# Patient Record
Sex: FEMALE | Race: WHITE | NOT HISPANIC OR LATINO | Employment: UNEMPLOYED | URBAN - METROPOLITAN AREA
[De-identification: names, ages, dates, MRNs, and addresses within clinical notes are randomized per-mention and may not be internally consistent; named-entity substitution may affect disease eponyms.]

---

## 2017-05-24 ENCOUNTER — OFFICE VISIT (OUTPATIENT)
Dept: INTERNAL MEDICINE | Facility: CLINIC | Age: 32
End: 2017-05-24
Attending: FAMILY MEDICINE
Payer: OTHER GOVERNMENT

## 2017-05-24 ENCOUNTER — LAB VISIT (OUTPATIENT)
Dept: LAB | Facility: OTHER | Age: 32
End: 2017-05-24
Attending: FAMILY MEDICINE
Payer: OTHER GOVERNMENT

## 2017-05-24 VITALS
HEART RATE: 59 BPM | HEIGHT: 65 IN | WEIGHT: 120.13 LBS | SYSTOLIC BLOOD PRESSURE: 106 MMHG | OXYGEN SATURATION: 96 % | BODY MASS INDEX: 20.01 KG/M2 | DIASTOLIC BLOOD PRESSURE: 70 MMHG

## 2017-05-24 DIAGNOSIS — M25.532 LEFT WRIST PAIN: ICD-10-CM

## 2017-05-24 DIAGNOSIS — Z00.00 ANNUAL PHYSICAL EXAM: ICD-10-CM

## 2017-05-24 DIAGNOSIS — R20.0 NUMBNESS AND TINGLING IN BOTH HANDS: ICD-10-CM

## 2017-05-24 DIAGNOSIS — Z13.220 ENCOUNTER FOR LIPID SCREENING FOR CARDIOVASCULAR DISEASE: ICD-10-CM

## 2017-05-24 DIAGNOSIS — Z00.00 ANNUAL PHYSICAL EXAM: Primary | ICD-10-CM

## 2017-05-24 DIAGNOSIS — L65.9 HAIR LOSS: ICD-10-CM

## 2017-05-24 DIAGNOSIS — R20.2 NUMBNESS AND TINGLING IN BOTH HANDS: ICD-10-CM

## 2017-05-24 DIAGNOSIS — Z13.6 ENCOUNTER FOR LIPID SCREENING FOR CARDIOVASCULAR DISEASE: ICD-10-CM

## 2017-05-24 LAB
25(OH)D3+25(OH)D2 SERPL-MCNC: 62 NG/ML
ALBUMIN SERPL BCP-MCNC: 5 G/DL
ALP SERPL-CCNC: 84 U/L
ALT SERPL W/O P-5'-P-CCNC: 15 U/L
ANION GAP SERPL CALC-SCNC: 15 MMOL/L
AST SERPL-CCNC: 26 U/L
BASOPHILS # BLD AUTO: 0.04 K/UL
BASOPHILS NFR BLD: 0.7 %
BILIRUB SERPL-MCNC: 2 MG/DL
BUN SERPL-MCNC: 14 MG/DL
CALCIUM SERPL-MCNC: 9.9 MG/DL
CHLORIDE SERPL-SCNC: 103 MMOL/L
CHOLEST/HDLC SERPL: 2.8 {RATIO}
CO2 SERPL-SCNC: 23 MMOL/L
CREAT SERPL-MCNC: 1.1 MG/DL
DIFFERENTIAL METHOD: NORMAL
EOSINOPHIL # BLD AUTO: 0 K/UL
EOSINOPHIL NFR BLD: 0.4 %
ERYTHROCYTE [DISTWIDTH] IN BLOOD BY AUTOMATED COUNT: 13.2 %
EST. GFR  (AFRICAN AMERICAN): >60 ML/MIN/1.73 M^2
EST. GFR  (NON AFRICAN AMERICAN): >60 ML/MIN/1.73 M^2
FOLATE SERPL-MCNC: 17.3 NG/ML
GLUCOSE SERPL-MCNC: 97 MG/DL
HCT VFR BLD AUTO: 41.9 %
HDL/CHOLESTEROL RATIO: 36.2 %
HDLC SERPL-MCNC: 199 MG/DL
HDLC SERPL-MCNC: 72 MG/DL
HGB BLD-MCNC: 13.9 G/DL
LDLC SERPL CALC-MCNC: 118 MG/DL
LYMPHOCYTES # BLD AUTO: 1.6 K/UL
LYMPHOCYTES NFR BLD: 27.6 %
MCH RBC QN AUTO: 29.1 PG
MCHC RBC AUTO-ENTMCNC: 33.2 %
MCV RBC AUTO: 88 FL
MONOCYTES # BLD AUTO: 0.3 K/UL
MONOCYTES NFR BLD: 4.6 %
NEUTROPHILS # BLD AUTO: 3.8 K/UL
NEUTROPHILS NFR BLD: 66.5 %
NONHDLC SERPL-MCNC: 127 MG/DL
PLATELET # BLD AUTO: 254 K/UL
PMV BLD AUTO: 11 FL
POTASSIUM SERPL-SCNC: 3.3 MMOL/L
PROT SERPL-MCNC: 8.4 G/DL
RBC # BLD AUTO: 4.77 M/UL
SODIUM SERPL-SCNC: 141 MMOL/L
T4 FREE SERPL-MCNC: 0.98 NG/DL
TRIGL SERPL-MCNC: 45 MG/DL
TSH SERPL DL<=0.005 MIU/L-ACNC: 1.83 UIU/ML
VIT B12 SERPL-MCNC: 882 PG/ML
WBC # BLD AUTO: 5.66 K/UL

## 2017-05-24 PROCEDURE — 82306 VITAMIN D 25 HYDROXY: CPT

## 2017-05-24 PROCEDURE — 82746 ASSAY OF FOLIC ACID SERUM: CPT

## 2017-05-24 PROCEDURE — 99999 PR PBB SHADOW E&M-EST. PATIENT-LVL III: CPT | Mod: PBBFAC,,, | Performed by: FAMILY MEDICINE

## 2017-05-24 PROCEDURE — 80053 COMPREHEN METABOLIC PANEL: CPT

## 2017-05-24 PROCEDURE — 86038 ANTINUCLEAR ANTIBODIES: CPT

## 2017-05-24 PROCEDURE — 80061 LIPID PANEL: CPT

## 2017-05-24 PROCEDURE — 85025 COMPLETE CBC W/AUTO DIFF WBC: CPT

## 2017-05-24 PROCEDURE — 99213 OFFICE O/P EST LOW 20 MIN: CPT | Mod: PBBFAC | Performed by: FAMILY MEDICINE

## 2017-05-24 PROCEDURE — 82607 VITAMIN B-12: CPT

## 2017-05-24 PROCEDURE — 36415 COLL VENOUS BLD VENIPUNCTURE: CPT

## 2017-05-24 PROCEDURE — 84439 ASSAY OF FREE THYROXINE: CPT

## 2017-05-24 PROCEDURE — 84443 ASSAY THYROID STIM HORMONE: CPT

## 2017-05-24 PROCEDURE — 99385 PREV VISIT NEW AGE 18-39: CPT | Mod: S$PBB,,, | Performed by: FAMILY MEDICINE

## 2017-05-24 NOTE — PROGRESS NOTES
"Subjective:      Patient ID: Nicolasa Wagoner is a 31 y.o. female.    Chief Complaint: Establish Care    She is here for annual exam. She is currently nursing. She is experiencing worry for any twinge. She will noticed her left wrist with bother her at times. It is a dull pain that lasts for a few minutes. If she re-focuses herself the wrist pain resolves. She denies weakness or dropping items. She denies loss of vision or headaches. She denies any trauma or injury to her neck. She will wear her son on her upper back and he is about 20 pounds. She will notice her hands falling asleep at times. She feels that her hair might be falling out more and she is 9 months post partum.       Review of Systems   Constitutional: Negative for activity change and unexpected weight change.   HENT: Negative for hearing loss, rhinorrhea and trouble swallowing.    Eyes: Negative for discharge and visual disturbance.   Respiratory: Negative for chest tightness and wheezing.    Cardiovascular: Negative for chest pain and palpitations.   Gastrointestinal: Negative for blood in stool, constipation, diarrhea and vomiting.   Endocrine: Negative for polydipsia and polyuria.   Genitourinary: Negative for difficulty urinating, dysuria, hematuria and menstrual problem.   Musculoskeletal: Negative for arthralgias and joint swelling.   Neurological: Negative for weakness and headaches.   Psychiatric/Behavioral: Negative for confusion and dysphoric mood.     I personally reviewed Past Medical History, Past Surgical history,  Past Social History and Family History    Objective:   /70   Pulse (!) 59   Ht 5' 5" (1.651 m)   Wt 54.5 kg (120 lb 2.4 oz)   SpO2 96%   BMI 19.99 kg/m²     Physical Exam   Constitutional: She is oriented to person, place, and time. She appears well-developed and well-nourished. No distress.   HENT:   Head: Normocephalic.   Right Ear: External ear normal.   Left Ear: External ear normal.   Mouth/Throat: " Oropharynx is clear and moist.   Eyes: Conjunctivae and EOM are normal. Pupils are equal, round, and reactive to light. Right eye exhibits no discharge. Left eye exhibits no discharge. No scleral icterus.   Neck: Normal range of motion. No tracheal deviation present. No thyromegaly present.   Cardiovascular: Normal rate, regular rhythm, normal heart sounds and intact distal pulses.  Exam reveals no gallop.    No murmur heard.  Pulmonary/Chest: Effort normal and breath sounds normal. No respiratory distress. She has no wheezes. She has no rales. She exhibits no tenderness.   Abdominal: Soft. Bowel sounds are normal. She exhibits no distension and no mass. There is no tenderness. There is no rebound and no guarding.   Musculoskeletal: Normal range of motion.        Right wrist: Normal.        Left wrist: Normal.        Cervical back: Normal.        Right hand: Normal.        Left hand: Normal.   Neurological: She is alert and oriented to person, place, and time.   Skin: Skin is warm and dry.   Psychiatric: She has a normal mood and affect. Her behavior is normal. Judgment and thought content normal.   Vitals reviewed.      Nicolasa was seen today for establish care.    Diagnoses and all orders for this visit:    Annual physical exam  -     Ambulatory consult to Dermatology  -     CBC auto differential; Future  -     Comprehensive metabolic panel; Future  -     Lipid panel; Future  -     Vitamin D; Future  -     TSH; Future  -     T4, free; Future  -     Vitamin B12; Future  -     Folate; Future  -     SHANE; Future    Encounter for lipid screening for cardiovascular disease  -     Lipid panel; Future    Left wrist pain  -  Patient to start exercises and call in one week if no improvement     Hair loss  -     Ambulatory consult to Dermatology  -     TSH; Future  -     T4, free; Future    Numbness and tingling in both hands  -    Patient to call if no improvement, will attain EMG studies and xray of neck    Other orders  -      Cancel: Lipid panel; Future  -     arm brace (WRIST BRACE) Misc; 1 application by Misc.(Non-Drug; Combo Route) route once daily at 6am.

## 2017-05-25 ENCOUNTER — TELEPHONE (OUTPATIENT)
Dept: INTERNAL MEDICINE | Facility: CLINIC | Age: 32
End: 2017-05-25

## 2017-05-25 DIAGNOSIS — Z00.00 ANNUAL PHYSICAL EXAM: Primary | ICD-10-CM

## 2017-05-25 DIAGNOSIS — E87.6 LOW SERUM POTASSIUM: ICD-10-CM

## 2017-05-25 LAB — ANA SER QL IF: NORMAL

## 2017-05-25 NOTE — TELEPHONE ENCOUNTER
Your potassium is low and your bilirubin is elevated, we will recheck these in one week. With the potassium please modify your diet to include more--bananas, oranges, potatoes, spinach, tomatoes, etc. You may also look up on the internet for other options.  Please make sure you are well hydrated prior to your repeat

## 2017-05-25 NOTE — TELEPHONE ENCOUNTER
Informed pt of lab results and advise. Scheduled labs for 5/31. Pt demonstrated verbal understanding of information and had no further questions or concerns at this time.

## 2017-05-31 ENCOUNTER — LAB VISIT (OUTPATIENT)
Dept: LAB | Facility: OTHER | Age: 32
End: 2017-05-31
Attending: FAMILY MEDICINE
Payer: OTHER GOVERNMENT

## 2017-05-31 DIAGNOSIS — E87.6 LOW SERUM POTASSIUM: ICD-10-CM

## 2017-05-31 DIAGNOSIS — Z00.00 ANNUAL PHYSICAL EXAM: ICD-10-CM

## 2017-05-31 LAB
ALBUMIN SERPL BCP-MCNC: 4.4 G/DL
ALBUMIN SERPL BCP-MCNC: 4.4 G/DL
ALP SERPL-CCNC: 77 U/L
ALT SERPL W/O P-5'-P-CCNC: 15 U/L
ANION GAP SERPL CALC-SCNC: 7 MMOL/L
AST SERPL-CCNC: 21 U/L
BILIRUB DIRECT SERPL-MCNC: 0.5 MG/DL
BILIRUB SERPL-MCNC: 1.4 MG/DL
BUN SERPL-MCNC: 21 MG/DL
CALCIUM SERPL-MCNC: 9.6 MG/DL
CHLORIDE SERPL-SCNC: 106 MMOL/L
CO2 SERPL-SCNC: 27 MMOL/L
CREAT SERPL-MCNC: 1 MG/DL
EST. GFR  (AFRICAN AMERICAN): >60 ML/MIN/1.73 M^2
EST. GFR  (NON AFRICAN AMERICAN): >60 ML/MIN/1.73 M^2
GGT SERPL-CCNC: 12 U/L
GLUCOSE SERPL-MCNC: 100 MG/DL
HAV IGM SERPL QL IA: NEGATIVE
HBV CORE IGM SERPL QL IA: NEGATIVE
HBV SURFACE AG SERPL QL IA: NEGATIVE
HCV AB SERPL QL IA: NEGATIVE
INR PPP: 1
MAGNESIUM SERPL-MCNC: 2.1 MG/DL
POTASSIUM SERPL-SCNC: 3.9 MMOL/L
PROT SERPL-MCNC: 7.6 G/DL
PROTHROMBIN TIME: 10.7 SEC
SODIUM SERPL-SCNC: 140 MMOL/L

## 2017-05-31 PROCEDURE — 85610 PROTHROMBIN TIME: CPT

## 2017-05-31 PROCEDURE — 83735 ASSAY OF MAGNESIUM: CPT

## 2017-05-31 PROCEDURE — 82977 ASSAY OF GGT: CPT

## 2017-05-31 PROCEDURE — 82248 BILIRUBIN DIRECT: CPT

## 2017-05-31 PROCEDURE — 80074 ACUTE HEPATITIS PANEL: CPT

## 2017-05-31 PROCEDURE — 36415 COLL VENOUS BLD VENIPUNCTURE: CPT

## 2017-05-31 PROCEDURE — 80053 COMPREHEN METABOLIC PANEL: CPT

## 2018-01-03 ENCOUNTER — OFFICE VISIT (OUTPATIENT)
Dept: OBSTETRICS AND GYNECOLOGY | Facility: CLINIC | Age: 33
End: 2018-01-03
Payer: OTHER GOVERNMENT

## 2018-01-03 ENCOUNTER — LAB VISIT (OUTPATIENT)
Dept: LAB | Facility: OTHER | Age: 33
End: 2018-01-03
Payer: OTHER GOVERNMENT

## 2018-01-03 VITALS
HEIGHT: 65 IN | BODY MASS INDEX: 19.62 KG/M2 | DIASTOLIC BLOOD PRESSURE: 60 MMHG | WEIGHT: 117.75 LBS | SYSTOLIC BLOOD PRESSURE: 98 MMHG

## 2018-01-03 DIAGNOSIS — Z32.00 POSSIBLE PREGNANCY: ICD-10-CM

## 2018-01-03 DIAGNOSIS — Z32.00 POSSIBLE PREGNANCY: Primary | ICD-10-CM

## 2018-01-03 LAB
ABO + RH BLD: NORMAL
ALBUMIN SERPL BCP-MCNC: 4.5 G/DL
ALP SERPL-CCNC: 51 U/L
ALT SERPL W/O P-5'-P-CCNC: 20 U/L
ANION GAP SERPL CALC-SCNC: 8 MMOL/L
AST SERPL-CCNC: 21 U/L
B-HCG UR QL: POSITIVE
BASOPHILS # BLD AUTO: 0.04 K/UL
BASOPHILS NFR BLD: 0.4 %
BILIRUB SERPL-MCNC: 1.8 MG/DL
BLD GP AB SCN CELLS X3 SERPL QL: NORMAL
BUN SERPL-MCNC: 12 MG/DL
CALCIUM SERPL-MCNC: 9.1 MG/DL
CHLORIDE SERPL-SCNC: 99 MMOL/L
CO2 SERPL-SCNC: 27 MMOL/L
CREAT SERPL-MCNC: 0.7 MG/DL
CTP QC/QA: YES
DIFFERENTIAL METHOD: NORMAL
EOSINOPHIL # BLD AUTO: 0.1 K/UL
EOSINOPHIL NFR BLD: 0.5 %
ERYTHROCYTE [DISTWIDTH] IN BLOOD BY AUTOMATED COUNT: 13.7 %
EST. GFR  (AFRICAN AMERICAN): >60 ML/MIN/1.73 M^2
EST. GFR  (NON AFRICAN AMERICAN): >60 ML/MIN/1.73 M^2
GLUCOSE SERPL-MCNC: 89 MG/DL
HCT VFR BLD AUTO: 37.6 %
HGB BLD-MCNC: 12.4 G/DL
LYMPHOCYTES # BLD AUTO: 2 K/UL
LYMPHOCYTES NFR BLD: 21.2 %
MCH RBC QN AUTO: 28.9 PG
MCHC RBC AUTO-ENTMCNC: 33 G/DL
MCV RBC AUTO: 88 FL
MONOCYTES # BLD AUTO: 0.5 K/UL
MONOCYTES NFR BLD: 4.8 %
NEUTROPHILS # BLD AUTO: 6.9 K/UL
NEUTROPHILS NFR BLD: 72.9 %
PLATELET # BLD AUTO: 312 K/UL
PMV BLD AUTO: 10.6 FL
POTASSIUM SERPL-SCNC: 3.5 MMOL/L
PROT SERPL-MCNC: 8 G/DL
RBC # BLD AUTO: 4.29 M/UL
SODIUM SERPL-SCNC: 134 MMOL/L
WBC # BLD AUTO: 9.47 K/UL

## 2018-01-03 PROCEDURE — 99214 OFFICE O/P EST MOD 30 MIN: CPT | Mod: S$PBB,,, | Performed by: ADVANCED PRACTICE MIDWIFE

## 2018-01-03 PROCEDURE — 87491 CHLMYD TRACH DNA AMP PROBE: CPT

## 2018-01-03 PROCEDURE — 85025 COMPLETE CBC W/AUTO DIFF WBC: CPT

## 2018-01-03 PROCEDURE — 86762 RUBELLA ANTIBODY: CPT

## 2018-01-03 PROCEDURE — 99212 OFFICE O/P EST SF 10 MIN: CPT | Mod: PBBFAC,25 | Performed by: ADVANCED PRACTICE MIDWIFE

## 2018-01-03 PROCEDURE — 86703 HIV-1/HIV-2 1 RESULT ANTBDY: CPT

## 2018-01-03 PROCEDURE — 99999 PR PBB SHADOW E&M-EST. PATIENT-LVL II: CPT | Mod: PBBFAC,,, | Performed by: ADVANCED PRACTICE MIDWIFE

## 2018-01-03 PROCEDURE — 87340 HEPATITIS B SURFACE AG IA: CPT

## 2018-01-03 PROCEDURE — 80053 COMPREHEN METABOLIC PANEL: CPT

## 2018-01-03 PROCEDURE — 81025 URINE PREGNANCY TEST: CPT | Mod: PBBFAC | Performed by: ADVANCED PRACTICE MIDWIFE

## 2018-01-03 PROCEDURE — 36415 COLL VENOUS BLD VENIPUNCTURE: CPT

## 2018-01-03 PROCEDURE — 86592 SYPHILIS TEST NON-TREP QUAL: CPT

## 2018-01-03 PROCEDURE — 86850 RBC ANTIBODY SCREEN: CPT

## 2018-01-03 NOTE — PROGRESS NOTES
Nicolasa Wagoner is a 32 y.o. , presents today for amenorrhea.    Has not seen any other provider for this possible pregnancy.     C/C: amenorrhea    HPI: Reports amenorrhea since Patient's last menstrual period was 11/15/2017 (exact date). Stopped breastfeeding about a month ago. Had periods in Oct and Nov only since she started having babies; they were 28 days apart.  12/10 pregnancy test was negative and 12/15 was positive. She is not currently on any contraception.  Also reports mild with nausea x1 vomiting. Has noticed breast tenderness. Denies vaginal bleeding since LMP.  Saw PCP in May and her Potassium was a little low and Bilirubin a little high. Increased dietary sources of Potassium and repeat blood work was better but bili slightly high still. Will repeat CMP with initial OB blood work.  Declined genetic screening, declined CF.    Reports no long-term chronic medical conditions     Review of patient's allergies indicates:  No Known Allergies  Past Medical History:   Diagnosis Date    Medical history non-contributory      Past Surgical History:   Procedure Laterality Date    none       Past Surgical History:   Procedure Laterality Date    none       OB History    Para Term  AB Living   3 2 2     2   SAB TAB Ectopic Multiple Live Births         0 2      # Outcome Date GA Lbr Delbert/2nd Weight Sex Delivery Anes PTL Lv   3 Current            2 Term / 39w5d  3.118 kg (6 lb 14 oz) M Vag-Spont Local N VERONICA   1 Term 14 39w5d  2.92 kg (6 lb 7 oz) F Vag-Spont None N VERONICA      Birth Comments: Concern for IUGR        OB History      Para Term  AB Living    3 2 2     2    SAB TAB Ectopic Multiple Live Births          0 2        Social History     Social History    Marital status:      Spouse name: N/A    Number of children: N/A    Years of education: N/A     Occupational History    at home       Social History Main Topics    Smoking status: Never Smoker     Smokeless tobacco: Never Used    Alcohol use No    Drug use: No    Sexual activity: Yes     Partners: Male     Birth control/ protection: Condom     Other Topics Concern    Not on file     Social History Narrative     James at [employer]    Nicolasa Mcallister     Family History   Problem Relation Age of Onset    Crohn's disease Mother     Diabetes Paternal Aunt     Colon cancer Neg Hx     Ovarian cancer Neg Hx     Breast cancer Neg Hx      History   Sexual Activity    Sexual activity: Yes    Partners: Male    Birth control/ protection: Condom       GENETIC SCREENING   Patient's age 35 years or older as of estimated date of delivery? N  Nural tube defect (meningomyelocele, spina bifida, or anencephaly)? N  Down syndrome? N  Zain-Sachs (Ashkenazi Voodoo, Cajun, [place])? N  Canavan disease (Ashkenazi Voodoo)? N  Familial dysautonomia (Ashkenazi Voodoo)? N  Sickle cell disease or trait ()? N  Hemophilia or other blood disorders? N  Cystic fibrosis? N  Muscular dystrophy? N  Person's chorea? N  Thalassemia (Italian, Greek, Mediterranean, or  background) MCV less than 80? N  Congenital heart defect? HIS COUSIN (MOM'S SISTER'S GRANDCHILD.)  Mental retardation/autism? N   If Yes, was person tested for Fragile X? NA  Other inherited genetic or chromosomal disorder? N  Maternal metabolic disorder (e.g. type 1 diabetes, PKU)? N  Patient or baby's father had a child with birth defects not listed above? N  Recurrent pregnancy loss or a stillbirth: N  Medications (including supplements, vitamins, herbs or OTC drugs)/illicit/recreational drugs/alcohol since last menstrual period? N   If yes, agent(s) and strength/dose: N  List any other genetic risks: N  Comments/counseling: N    INFECTION HISTORY  Live with someone with TB or exposed to TB: N  Patient or partner has history of genital herpes: N  Rash or viral illness since last menstrual period: N  Patient or  partner has hepatitis B or C: N  History of STD, gonorrhea, chlamydia, HPV, HIV, syphilis (list all that apply): N  Recent travel to Zika risk areas N  List other infections: N  Additional comments: N    Miguelina Mayen MD     ROS:    Constitutional/Gen: Denies fevers, chills, malaise, or weight loss. + fatigue   Psych: Denies depression, anxiety  Eyes: Denies changes in vision or scotomata  Ears, nose, mouth, throat: Denies sinus tenderness, swelling, or dentition problems  CV/vasc: Denies heart palpitations or edema  Resp: Denies SOB or dyspnea  Breasts: Denies mass, nipple discharge, or trauma. + breast tenderness.  GI: Denies constipation, diarrhea, or vomiting. mild nausea.  : Denies vaginal discharge, dysuria or pelvic pain. no urinary frequency  MS: Denies weakness, soreness, or changes in ROM    OBJECTIVE:  General Appearance:    Alert, cooperative, no distress, appears stated age   Head:    Normocephalic, without obvious abnormality, atraumatic   Eyes:    PERRL, conjunctiva/corneas clear both eyes   Ears:    Normal external ear canals, both ears   Nose:   Nares normal, septum midline, mucosa normal, no drainage    or sinus tenderness   Throat:   Lips, mucosa, and tongue normal; teeth and gums normal   Neck:   Supple, symmetrical, trachea midline, no adenopathy;     thyroid:  no enlargement/tenderness/nodules   Back:     Symmetric, no curvature, ROM normal, no CVA tenderness   Lungs:     Clear to auscultation bilaterally, respirations unlabored   Chest Wall:    No tenderness or deformity    Heart:    Regular rate and rhythm, S1 and S2 normal, no murmur, rub   or gallop   Breast Exam:    No tenderness, masses, or nipple abnormality   Abdomen:     Soft, non-tender, bowel sounds active all four quadrants,     no masses, no organomegaly   Genitalia:    Normal female without lesion, discharge or tenderness                               Cervix:   Long, closed, non-tender   Uterus:   Enlargement compatible  with 7 weeks gestation; regular, mobile and non-tender, retroverted.   Adnexa:   Non-tender, without masses bilaterally       Extremities:   Extremities normal, atraumatic, no cyanosis or edema   Pulses:   2+ and symmetric all extremities   Skin:   Skin color, texture, turgor normal, no rashes or lesions   Lymph nodes:   Cervical, supraclavicular, and axillary nodes normal   Neurologic:   Normal strength, sensation and reflexes     throughout       UPT + in office    ASSESSMENT:  32 y.o. female  with amenorrhea  Likely at 7w0d via LMP; S=D  Body mass index is 19.59 kg/m².  Patient Active Problem List   Diagnosis    Possible pregnancy     Genetic screening offered and declined  CF offered and declined    PLAN:  Initial OB labs ordered.  Dating ultrasound ordered.  Prenatal vitamins ordered.  Pregnancy education and couseling; handouts and booklet provided and reviewed.  -- She has already attended meet and greet and has  toured facility  -- Oriented to practice and anticipated prenatal course of care  -- Precautions/warning signs and how to reach us reviewed  -- Common complaints of pregnancy  -- Routine prenatal labs including HIV  -- Ultrasounds  -- Childbirth education/hospital/[facility] facilities  -- Nutrition and food safety  -- Toxoplasmosis precautions (Cats/Raw Meat)  -- Sexual activity and exercise  -- Environmental/Work hazards  -- Travel  -- Tobacco (Ask, Advise, Assess, Assist, and Arrange), as well as alcohol and drug use  -- Use of any medications (Including supplements, Vitamins, Herbs, or OTC Drugs)      Reviewed use of B6/Unisom prn and dietary modifications to reduce nausea. Pt will notify us if no relief/worsening symptoms, will consider alternative therapies prn    RTC x 4 weeks, call or present sooner prn.

## 2018-01-04 LAB
C TRACH DNA SPEC QL NAA+PROBE: NOT DETECTED
HBV SURFACE AG SERPL QL IA: NEGATIVE
HIV 1+2 AB+HIV1 P24 AG SERPL QL IA: NEGATIVE
N GONORRHOEA DNA SPEC QL NAA+PROBE: NOT DETECTED
RPR SER QL: NORMAL
RUBV IGG SER-ACNC: 12.7 IU/ML
RUBV IGG SER-IMP: REACTIVE

## 2018-01-08 ENCOUNTER — PROCEDURE VISIT (OUTPATIENT)
Dept: OBSTETRICS AND GYNECOLOGY | Facility: CLINIC | Age: 33
End: 2018-01-08
Payer: OTHER GOVERNMENT

## 2018-01-08 DIAGNOSIS — Z32.00 POSSIBLE PREGNANCY: ICD-10-CM

## 2018-01-08 DIAGNOSIS — Z36.89 ESTABLISH GESTATIONAL AGE, ULTRASOUND: ICD-10-CM

## 2018-01-08 DIAGNOSIS — N91.1 SECONDARY AMENORRHEA: ICD-10-CM

## 2018-01-08 PROCEDURE — 76817 TRANSVAGINAL US OBSTETRIC: CPT | Mod: 26,S$PBB,, | Performed by: OBSTETRICS & GYNECOLOGY

## 2018-01-08 PROCEDURE — 76817 TRANSVAGINAL US OBSTETRIC: CPT | Mod: PBBFAC | Performed by: OBSTETRICS & GYNECOLOGY

## 2018-01-08 NOTE — PROCEDURES
Procedures   Obstetrical ultrasound completed today.  See report in imaging section of [facility].

## 2018-01-16 ENCOUNTER — TELEPHONE (OUTPATIENT)
Dept: OBSTETRICS AND GYNECOLOGY | Facility: CLINIC | Age: 33
End: 2018-01-16

## 2018-01-16 NOTE — TELEPHONE ENCOUNTER
Returned pt phone call.  Dx at urgent care yesterday with strep A and given rx for Tamiflu.  Advised OK to take, rest, increased fluids.  Call or go to ER with worsening sx.  ----- Message from Angela Roche sent at 1/16/2018  8:17 AM CST -----  x_  1st Request  _  2nd Request  _  3rd Request        Who: ronaldo    Why: pt. Would like to speak with midwife regarding flu medication. Pt. Stating she tested positive for flu A at the urgent clinic & wants to know can she take tamiflu.please call to discuss    What Number to Call Back:[phone]    When to Expect a call back: (Before the end of the day)   -- if the call is after 12:00, the call back will be tomorrow.

## 2018-01-30 ENCOUNTER — PATIENT MESSAGE (OUTPATIENT)
Dept: INTERNAL MEDICINE | Facility: CLINIC | Age: 33
End: 2018-01-30

## 2018-01-31 ENCOUNTER — INITIAL PRENATAL (OUTPATIENT)
Dept: OBSTETRICS AND GYNECOLOGY | Facility: CLINIC | Age: 33
End: 2018-01-31
Payer: OTHER GOVERNMENT

## 2018-01-31 VITALS
DIASTOLIC BLOOD PRESSURE: 82 MMHG | SYSTOLIC BLOOD PRESSURE: 120 MMHG | WEIGHT: 119.06 LBS | BODY MASS INDEX: 19.81 KG/M2

## 2018-01-31 DIAGNOSIS — R17 TOTAL BILIRUBIN, ELEVATED: Primary | ICD-10-CM

## 2018-01-31 DIAGNOSIS — Z34.91 PRENATAL CARE IN FIRST TRIMESTER: ICD-10-CM

## 2018-01-31 DIAGNOSIS — Z34.90 PREGNANCY, UNSPECIFIED GESTATIONAL AGE: ICD-10-CM

## 2018-01-31 PROCEDURE — 99999 PR PBB SHADOW E&M-EST. PATIENT-LVL III: CPT | Mod: PBBFAC,,, | Performed by: ADVANCED PRACTICE MIDWIFE

## 2018-01-31 PROCEDURE — 0500F INITIAL PRENATAL CARE VISIT: CPT | Mod: ,,, | Performed by: ADVANCED PRACTICE MIDWIFE

## 2018-01-31 PROCEDURE — 99213 OFFICE O/P EST LOW 20 MIN: CPT | Mod: PBBFAC | Performed by: ADVANCED PRACTICE MIDWIFE

## 2018-01-31 RX ORDER — OSELTAMIVIR PHOSPHATE 75 MG/1
CAPSULE ORAL
COMMUNITY
Start: 2018-01-16 | End: 2018-02-27 | Stop reason: ALTCHOICE

## 2018-01-31 NOTE — PROGRESS NOTES
32 y.o.  at 11w0d  Doing well    TW lbs , diet and exercise reviewed  No vaginal bleeding, no pain  Reviewed prenatal labs, elevated bilirubin persisted.  Pt had contacted her primary care provider about this and GI evaluation was recommended.  GI referral ordered today.  1st trimester genetic testing declined  20 week ultrasound ordered  Reviewed warning signs, pregnancy precautions and how/when to call.  RTC 4 wks, call or present sooner prn.  James is going to [place] for 10 days to work in a [place]. Advised to make sure his vaccinations are up to day and to have any recommended vaccination prior to trip.

## 2018-02-02 ENCOUNTER — TELEPHONE (OUTPATIENT)
Dept: OBSTETRICS AND GYNECOLOGY | Facility: CLINIC | Age: 33
End: 2018-02-02

## 2018-02-02 NOTE — TELEPHONE ENCOUNTER
"Returned pt's call.  Reports doing well currently.  Got a Fit Bit this week and has been watching her heart rate.  Reports believes her nonpregnant heart rate typically 50-60s. Reports noting her heart rate is going up to  with activity (with kids at park, climbing stairs with toddler last night, putting kids to bed, etc) and concerned regarding this.  Reports last night after climbing stairs and putting toddler to bed felt like she had to "take like 2-3 breaths to catch my breath and my heart rate was almost 100". Heart rate back to 60s within minutes of sitting to rest.  Discussed physiologic changes in pregnancy at length, along with warning s/s to present immediately to ED with (such as feeling as though reccurrent/prolonged feeling heart is racing/pounding, shortness of breath, difficulty breathing, etc).    Also reports some congestion to sinuses but no additional s/s and does not believe she's ill. Again reviewed physiologic changes of pregnancy and recommend Claritin or Zyrtec if needed.    ----- Message from Tiny Forrest sent at 2/2/2018  4:37 PM CST -----  Contact: YOLI ALBRECHT [[id number]]  x_  1st Request  _  2nd Request  _  3rd Request        Who: YOLI ALBRECHT [[id number]]    Why: Requesting a call back in regards to her heart rate is higher than normal and shortness of breath.     What Number to Call Back: [phone]    When to Expect a call back: (Within 24 hours)    Please return the call at earliest convenience. Thanks!                              "

## 2018-02-27 ENCOUNTER — ROUTINE PRENATAL (OUTPATIENT)
Dept: OBSTETRICS AND GYNECOLOGY | Facility: CLINIC | Age: 33
End: 2018-02-27
Payer: OTHER GOVERNMENT

## 2018-02-27 VITALS
BODY MASS INDEX: 20.42 KG/M2 | DIASTOLIC BLOOD PRESSURE: 80 MMHG | WEIGHT: 122.69 LBS | SYSTOLIC BLOOD PRESSURE: 118 MMHG

## 2018-02-27 DIAGNOSIS — Z34.92 PRENATAL CARE IN SECOND TRIMESTER: Primary | ICD-10-CM

## 2018-02-27 PROBLEM — Z32.00 POSSIBLE PREGNANCY: Status: RESOLVED | Noted: 2018-01-03 | Resolved: 2018-02-27

## 2018-02-27 PROCEDURE — 99999 PR PBB SHADOW E&M-EST. PATIENT-LVL III: CPT | Mod: PBBFAC,,, | Performed by: ADVANCED PRACTICE MIDWIFE

## 2018-02-27 PROCEDURE — 0502F SUBSEQUENT PRENATAL CARE: CPT | Mod: ,,, | Performed by: ADVANCED PRACTICE MIDWIFE

## 2018-02-27 PROCEDURE — 87086 URINE CULTURE/COLONY COUNT: CPT

## 2018-02-27 PROCEDURE — 99213 OFFICE O/P EST LOW 20 MIN: CPT | Mod: PBBFAC | Performed by: ADVANCED PRACTICE MIDWIFE

## 2018-02-27 NOTE — PROGRESS NOTES
32 y.o. female  at 14w6d by LMP c/w 7wk US  Accompanied by family today - Benny, Ary, and Jose  Denies VB, LOF or cramping  Doing well   Elevated bilirubin  -- Has appt with GI scheduled  Reviewed prenatal labs  Urine culture sent today  Genetic testing declined  Anatomy US already scheduled  Reviewed warning signs, normal FM, pregnancy precautions and how/when to call.  RTC x 3 wks, call or present sooner prn.

## 2018-02-28 LAB — BACTERIA UR CULT: NO GROWTH

## 2018-03-13 ENCOUNTER — OFFICE VISIT (OUTPATIENT)
Dept: GASTROENTEROLOGY | Facility: CLINIC | Age: 33
End: 2018-03-13
Payer: OTHER GOVERNMENT

## 2018-03-13 VITALS
DIASTOLIC BLOOD PRESSURE: 76 MMHG | SYSTOLIC BLOOD PRESSURE: 123 MMHG | WEIGHT: 126.56 LBS | BODY MASS INDEX: 21.09 KG/M2 | HEART RATE: 88 BPM | HEIGHT: 65 IN

## 2018-03-13 DIAGNOSIS — R17 ELEVATED BILIRUBIN: Primary | ICD-10-CM

## 2018-03-13 DIAGNOSIS — Z3A.16 16 WEEKS GESTATION OF PREGNANCY: ICD-10-CM

## 2018-03-13 PROCEDURE — 99213 OFFICE O/P EST LOW 20 MIN: CPT | Mod: PBBFAC | Performed by: PHYSICIAN ASSISTANT

## 2018-03-13 PROCEDURE — 99999 PR PBB SHADOW E&M-EST. PATIENT-LVL III: CPT | Mod: PBBFAC,,, | Performed by: PHYSICIAN ASSISTANT

## 2018-03-13 PROCEDURE — 99203 OFFICE O/P NEW LOW 30 MIN: CPT | Mod: S$PBB,,, | Performed by: PHYSICIAN ASSISTANT

## 2018-03-13 NOTE — LETTER
March 13, 2018      Sandra Nettles, CNM  [address]  [facility]  [facility] [address]           [facility]  [address]  [facility] [address]  Phone: [phone]  Fax: [phone]          Patient: Nicolasa Wagoner   MR Number: [id number]   YOB: 1985   Date of Visit: 3/13/2018       Dear Sandra Nettles:    Thank you for referring Nicolasa Wagoner to me for evaluation. Attached you will find relevant portions of my assessment and plan of care.    If you have questions, please do not hesitate to call me. I look forward to following Nicolasa Wagoner along with you.    Sincerely,    FLORENCIA Carrera  CC:  No Recipients    If you would like to receive this communication electronically, please contact [email or web address] or [phone] to request more information on [facility] access.    For providers and/or their staff who would like to refer a patient to [facility], please contact us through our one-stop-shop provider referral line, [facility], at [phone].    If you feel you have received this communication in error or would no longer like to receive these types of communications, please e-mail [email or web address]

## 2018-03-13 NOTE — PROGRESS NOTES
[facility] Consultation Note    Reason for Consult:  The encounter diagnosis was Elevated bilirubin.    PCP:   Miguelina Mayen   [address] [facility] [address] / [facility] [address]    Referring MD:  Sandra Nettles, Imelda  [address]  [place] [address]    HPI:  This is a 32 y.o. female  16 weeks pregnant, here for evaluation of elevated liver enzymes.  Dx with elevated liver enzymes 5/2017. No labs for comparison prior to this date  5/2017 T. Bili  2.0   Labs were repeated 7 months later when pt became pregnant  1/13/18 T . Bili 1.8, alk phos 51 (low)    Today she has no complaints. She denies abdominal pain, nausea, vomiting, GERD, dysphagia, constipation, diarrhea, BRBPR, melena, or jaundice    ROS:  Constitutional: No fevers, chills, No weight loss  ENT: No allergies  CV: No chest pain  Pulm: No cough, No shortness of breath  Ophtho: No vision changes  GI: see HPI  Derm: No rash  Heme: No lymphadenopathy, No bruising  MSK: No arthritis  : No dysuria, No hematuria  Endo: No hot or cold intolerance  Neuro: No syncope, No seizure  Psych: No anxiety, No depression    Medical History:  has a past medical history of Elevated bilirubin and Medical history non-contributory.    Surgical History:  has a past surgical history that includes none.    Family History: family history includes Crohn's disease in her mother; Diabetes in her paternal aunt..     Social History:  reports that she has never smoked. She has never used smokeless tobacco. She reports that she does not drink alcohol or use drugs.    Review of patient's allergies indicates:  No Known Allergies    Current Outpatient Prescriptions on File Prior to Visit   Medication Sig Dispense Refill    PRENATAL VIT W-CA,FE,FA,<1 MG, (PRENATAL VITAMIN ORAL) Take by mouth.      arm brace (WRIST BRACE) Misc 1 application by Misc.(Non-Drug; Combo Route) route once daily at 6am. 1 each 0     No current  "facility-administered medications on file prior to visit.          Objective Findings:    Vital Signs:  /76   Pulse 88   Ht 5' 5" (1.651 m)   Wt 57.4 kg (126 lb 8.7 oz)   LMP 11/15/2017 (Exact Date)   BMI 21.06 kg/m²   Body mass index is 21.06 kg/m².    Physical Exam:  General Appearance: Well appearing in no acute distress  Head:   Normocephalic, without obvious abnormality  Eyes:    No scleral icterus  ENT: Neck supple, Lips, mucosa, and tongue normal  Lungs: CTA bilaterally in anterior and posterior fields, no wheezes, no crackles.  Heart:  Regular rate and rhythm, S1, S2 normal, no murmurs heard  Abdomen: not performed due to patient pregnancy  Extremities: no edema  Skin: No rash  Neurologic: AAO x 3      Labs:  Lab Results   Component Value Date    WBC 9.47 01/03/2018    HGB 12.4 01/03/2018    HCT 37.6 01/03/2018     01/03/2018    CHOL 199 05/24/2017    TRIG 45 05/24/2017    HDL 72 05/24/2017    ALT 20 01/03/2018    AST 21 01/03/2018     (L) 01/03/2018    K 3.5 01/03/2018    CL 99 01/03/2018    CREATININE 0.7 01/03/2018    BUN 12 01/03/2018    CO2 27 01/03/2018    TSH 1.833 05/24/2017    INR 1.0 05/31/2017       Imaging:    Endoscopy:      Assessment:  1. Elevated bilirubin      33 yo female 16 weeks pregnant presents with elevated T. Bilirubin x 12 months. N0 prior blood work to compare. This elevated may be normal for her, but will rule out biliary obstruction    Recommendations:  1. ABD US to rule out biliary obstruction    Will likely refer to hepatology after US    No Follow-up on file.      Order summary:  Orders Placed This Encounter    US Abdomen Complete         Thank you so much for allowing me to participate in the care of Nicolasa Bridges PA-C        "

## 2018-03-16 ENCOUNTER — HOSPITAL ENCOUNTER (OUTPATIENT)
Dept: RADIOLOGY | Facility: OTHER | Age: 33
Discharge: HOME | End: 2018-03-16
Attending: PHYSICIAN ASSISTANT
Payer: OTHER GOVERNMENT

## 2018-03-16 DIAGNOSIS — R17 ELEVATED BILIRUBIN: ICD-10-CM

## 2018-03-16 PROCEDURE — 76700 US EXAM ABDOM COMPLETE: CPT | Mod: TC

## 2018-03-16 PROCEDURE — 76700 US EXAM ABDOM COMPLETE: CPT | Mod: 26,,, | Performed by: RADIOLOGY

## 2018-03-18 ENCOUNTER — PATIENT MESSAGE (OUTPATIENT)
Dept: GASTROENTEROLOGY | Facility: CLINIC | Age: 33
End: 2018-03-18

## 2018-03-18 ENCOUNTER — DOCUMENTATION ONLY (OUTPATIENT)
Dept: TRANSPLANT | Facility: CLINIC | Age: 33
End: 2018-03-18

## 2018-03-18 DIAGNOSIS — R74.8 ELEVATED LIVER ENZYMES: Primary | ICD-10-CM

## 2018-03-18 DIAGNOSIS — Z3A.17 17 WEEKS GESTATION OF PREGNANCY: ICD-10-CM

## 2018-03-18 NOTE — LETTER
March 18, 2018    Nicolasa Wagoner  [address] [address]      Dear Nicolasa Wagoner:    Your doctor has referred you to the [facility]. You will be contacted by our office and an initial appointment will then be scheduled for you.    We look forward to seeing you soon. If you have any further questions, please contact us at [phone].       Sincerely,        [facility]   [address] [place] [address] [id number] [phone]

## 2018-03-19 ENCOUNTER — TELEPHONE (OUTPATIENT)
Dept: GASTROENTEROLOGY | Facility: CLINIC | Age: 33
End: 2018-03-19

## 2018-03-19 NOTE — NURSING
Pt records reviewed.   Pt will be referred to Hepatology.    Initial referral received  from the workque.   Referring Provider/diagnosis  ELIJAH KHAN Provider:   Diagnosis: Elevated liver enzymes  17 weeks gestation of pregnancy           Referral letter sent to provider and patient.

## 2018-03-28 ENCOUNTER — OFFICE VISIT (OUTPATIENT)
Dept: MATERNAL FETAL MEDICINE | Facility: CLINIC | Age: 33
End: 2018-03-28
Payer: OTHER GOVERNMENT

## 2018-03-28 ENCOUNTER — ROUTINE PRENATAL (OUTPATIENT)
Dept: OBSTETRICS AND GYNECOLOGY | Facility: CLINIC | Age: 33
End: 2018-03-28
Payer: OTHER GOVERNMENT

## 2018-03-28 VITALS
SYSTOLIC BLOOD PRESSURE: 104 MMHG | BODY MASS INDEX: 20.62 KG/M2 | WEIGHT: 123.88 LBS | DIASTOLIC BLOOD PRESSURE: 68 MMHG

## 2018-03-28 DIAGNOSIS — Z34.90 PREGNANCY, UNSPECIFIED GESTATIONAL AGE: ICD-10-CM

## 2018-03-28 DIAGNOSIS — Z34.92 PRENATAL CARE IN SECOND TRIMESTER: Primary | ICD-10-CM

## 2018-03-28 DIAGNOSIS — Z36.89 ENCOUNTER FOR FETAL ANATOMIC SURVEY: ICD-10-CM

## 2018-03-28 PROCEDURE — 76817 TRANSVAGINAL US OBSTETRIC: CPT | Mod: PBBFAC | Performed by: OBSTETRICS & GYNECOLOGY

## 2018-03-28 PROCEDURE — 0502F SUBSEQUENT PRENATAL CARE: CPT | Mod: ,,, | Performed by: ADVANCED PRACTICE MIDWIFE

## 2018-03-28 PROCEDURE — 76805 OB US >/= 14 WKS SNGL FETUS: CPT | Mod: 26,S$PBB,, | Performed by: OBSTETRICS & GYNECOLOGY

## 2018-03-28 PROCEDURE — 99999 PR PBB SHADOW E&M-EST. PATIENT-LVL I: CPT | Mod: PBBFAC,,,

## 2018-03-28 PROCEDURE — 76805 OB US >/= 14 WKS SNGL FETUS: CPT | Mod: PBBFAC | Performed by: OBSTETRICS & GYNECOLOGY

## 2018-03-28 PROCEDURE — 76817 TRANSVAGINAL US OBSTETRIC: CPT | Mod: 26,S$PBB,, | Performed by: OBSTETRICS & GYNECOLOGY

## 2018-03-28 PROCEDURE — 99499 UNLISTED E&M SERVICE: CPT | Mod: S$PBB,,, | Performed by: OBSTETRICS & GYNECOLOGY

## 2018-03-28 PROCEDURE — 99211 OFF/OP EST MAY X REQ PHY/QHP: CPT | Mod: PBBFAC

## 2018-03-28 PROCEDURE — 99212 OFFICE O/P EST SF 10 MIN: CPT | Mod: PBBFAC,27,25 | Performed by: ADVANCED PRACTICE MIDWIFE

## 2018-03-28 PROCEDURE — 99999 PR PBB SHADOW E&M-EST. PATIENT-LVL II: CPT | Mod: PBBFAC,,, | Performed by: ADVANCED PRACTICE MIDWIFE

## 2018-03-28 NOTE — PROGRESS NOTES
OB Ultrasound Report (see PDF version under imaging tab)    Indication  ========    Fetal anatomy survey.    History  ======    General History  Other: ALEX SCHREIBER,  Previous Outcomes   3  Para 2    Pregnancy History  ===============    Maternal Lab Tests  Result:  declined screenings    Wants to know gender: yes    Method  ======    Transvaginal ultrasound examination, , Voluson E10, Transabdominal ultrasound examination. View: Good view.    Pregnancy  =========    Claros pregnancy. Number of fetuses: 1.    Dating  ======    Cycle: regular cycle  Ultrasound examination on: 3/28/2018  GA by U/S based upon: AC, BPD, Femur, HC  GA by U/S 19 w + 0 d  MONICO by U/S: 2018  Assigned: Dating performed on 2018, based on the LMP  Assigned GA 19 w + 0 d  Assigned MONICO: 2018    General Evaluation  ===============    Cardiac activity: present.  bpm.  Fetal movements: visualized.  Presentation: Variable.  Placenta:  Placental site: anterior  Umbilical cord: Cord vessels: 3 vessel cord. Cord insertion: placental insertion: normal.  Amniotic fluid: normal amount.    Fetal Biometry  ===========    Fetal Biometry  BPD 42.3 mm 18w 6d Hadlock  OFD 56.1 mm 19w 5d Bruno  .4 mm 18w 5d Hadlock  .1 mm 19w 3d Hadlock  Femur 29.9 mm 19w 2d Hadlock  Cerebellum tr 19.6 mm 19w 4d Amaral  CM 3.4 mm  Nuchal fold 3.12 mm  Humerus 29.1 mm 19w 3d Bruno   g  Calculated by: Hadlock (BPD-HC-AC-FL)  EFW (lb) 0 lb  EFW (oz) 10 oz  Cephalic index 0.75  HC / AC 1.13  FL / BPD 0.71  FL / AC 0.21   bpm  Head / Face / Neck   5.1 mm    Fetal Anatomy  ===========    Cranium: normal  Lateral ventricles: normal  Choroid plexus: normal  Midline falx: normal  Cavum septi pellucidi: normal  Cerebellum: normal  Cisterna magna: normal  Rt lateral ventricle: normal  Lt lateral ventricle: normal  Rt choroid plexus: normal  Lt choroid plexus: normal  Lips: normal  Profile: suboptimal  Nose: normal  4-chamber  view: normal  RVOT: normal  LVOT: normal  Situs: normal  Cardiac position: normal  Cardiac axis: normal  Cardiac size: normal  Cardiac rhythm: normal  Rt lung: normal  Lt lung: normal  Diaphragm: normal  Cord insertion: normal  Stomach: normal  Kidneys: normal  Bladder: normal  Genitals: normal  Abdom. wall: appears normal  Abdom. cavity: normal  Rt kidney: normal  Lt kidney: normal  Rt renal artery: visualized  Lt renal artery: visualized  Cervical spine: normal  Thoracic spine: normal  Lumbar spine: normal  Sacral spine: normal  Arms: both visualized  Legs: both visualized  Rt arm: normal  Lt arm: normal  Rt hand: present  Lt hand: present  Rt leg: normal  Lt leg: normal  Rt foot: normal  Lt foot: normal  Gender: female  Wants to know gender: yes    Maternal Structures  ===============    Uterus / Cervix  Approach: Transvaginal  Cervical length 42.0 mm  Ovaries / Tubes / Adnexa  Rt ovary: Visualized  Rt ovarian corpus luteum D1 19.0 mm  Rt ovarian corpus luteum D2 14.0 mm  Rt ovarian corpus luteum D3 13.0 mm  Lt ovary: Visualized    Impression  =========    A patrick living IUP is identified. Fetal size is appropriate for established dating. No fetal structural malformations are identified.  Cervical length is normal, and placental location is normal without evidence of previa. TV scanning was needed to accurately assess  the distance between the distal end of the placenta and the internal cervical os. Follow-up ultrasound as clinically indicated.

## 2018-03-28 NOTE — PROGRESS NOTES
32 y.o. female  at 19w0d by LMP c/w 7wk US  With  and two children today  Starting to feel flutters/FM, denies VB, LOF or cramping  Doing well  Reviewed anatomy US - comes from that today;  It's a girl!  Genetic testing declined  Elevated bilirubin  -- Has seen PA-C, abdominal US WNL, has f/u with hepatologist next week  Reviewed warning signs, normal FM,  labor precautions and how/when to call.  RTC x 4 wks, call or present sooner prn.     Birth Center Risk Assessment: 0  0- CNM management in ABC  1- CNM management on L&D  2- Consultation with OB to develop plan of care  3- Collaborative CNM/OB management with delivery on L&D  4- Referral or transfer of care to MD

## 2018-04-02 ENCOUNTER — OFFICE VISIT (OUTPATIENT)
Dept: HEPATOLOGY | Facility: CLINIC | Age: 33
End: 2018-04-02
Payer: OTHER GOVERNMENT

## 2018-04-02 VITALS
BODY MASS INDEX: 21.09 KG/M2 | WEIGHT: 126.56 LBS | TEMPERATURE: 97 F | SYSTOLIC BLOOD PRESSURE: 126 MMHG | RESPIRATION RATE: 18 BRPM | HEIGHT: 65 IN | HEART RATE: 72 BPM | DIASTOLIC BLOOD PRESSURE: 68 MMHG | OXYGEN SATURATION: 100 %

## 2018-04-02 DIAGNOSIS — E80.4 GILBERT SYNDROME: Primary | ICD-10-CM

## 2018-04-02 DIAGNOSIS — E80.6 UNCONJUGATED HYPERBILIRUBINEMIA: ICD-10-CM

## 2018-04-02 PROCEDURE — 99214 OFFICE O/P EST MOD 30 MIN: CPT | Mod: PBBFAC | Performed by: INTERNAL MEDICINE

## 2018-04-02 PROCEDURE — 99204 OFFICE O/P NEW MOD 45 MIN: CPT | Mod: S$PBB,,, | Performed by: INTERNAL MEDICINE

## 2018-04-02 PROCEDURE — 99999 PR PBB SHADOW E&M-EST. PATIENT-LVL IV: CPT | Mod: PBBFAC,,, | Performed by: INTERNAL MEDICINE

## 2018-04-02 NOTE — LETTER
April 2, 2018      Aleksandr Bridges PA-C  [address]  [place] [facility] [address]           [facility] - Hepatology  [address]  [place] [facility] [place] [facility] [address]  Phone: [phone]  Fax: [phone]          Patient: Nicolasa Wagoner   MR Number: [id number]   YOB: 1985   Date of Visit: 4/2/2018       Dear Aleksandr Bridges:    Thank you for referring Nicolasa Wagoner to me for evaluation. Attached you will find relevant portions of my assessment and plan of care.    If you have questions, please do not hesitate to call me. I look forward to following Nicolasa Wagoner along with you.    Sincerely,    Marcelle Estes MD    Enclosure  CC:  MD Sandra Avila, DREA Byrne, DREA Lin, DREA Waters, KARON Stapleton    If you would like to receive this communication electronically, please contact [email or web address] or [phone] to request more information on [facility] Link access.    For providers and/or their staff who would like to refer a patient to [facility], please contact us through our one-stop-shop provider referral line, [facility], at [phone].    If you feel you have received this communication in error or would no longer like to receive these types of communications, please e-mail [email or web address]

## 2018-04-02 NOTE — PROGRESS NOTES
Hepatology Consult Note    Referring provider: Aleksandr Bridges, LOUANN and Sandra Nettles CNM                 Chief complaint:   Chief Complaint   Patient presents with    elevated bilirubin       HPI:  Nicolasa Wagoner is a pleasant 32 y.o. femalewho was referred to Hepatology Clinic for consultation of had concerns including elevated bilirubin..      The patient is  and she is pregnant: 19w 5 days.  She was noted to have elevated bilirubin in 2018, t.bilirubin: 1.8. She has hx of asympatomatic elevated bilirubin prior to her pregnancy as well. Direct/indirect fractionation of bilirubin was not done. Ultrasound of liver/GB was not done.    Component      Latest Ref Rng & Units 1/3/2018 2017 2017 2017            7:24 AM  7:24 AM    Total Bilirubin      0.1 - 1.0 mg/dL 1.8 (H)  1.4 (H) 2.0 (H)     Viral serologies: negative.    Component      Latest Ref Rng & Units 1/3/2018 2017   Hepatitis B Surface Ag       Negative Negative   Hep B C IgM        Negative   Hep A IgM        Negative   Hepatitis C Ab        Negative     As regards to liver disease,  - The patient reports no symptoms of hepatitis including malaise or flu-like symptoms to suggest a flare.  - The patient reports no new manifestations of portal hypertension including ascites, edema, GI bleeding, or hepatic encephalopathy to suggest liver decompensation.  - The patient reports no fevers/chills or pruritis to suggest biliary disease.      Patient Active Problem List   Diagnosis    Total bilirubin, elevated - GI referral    Prenatal care in second trimester       Past Medical History:   Diagnosis Date    Elevated bilirubin     Medical history non-contributory        Past Surgical History:   Procedure Laterality Date    none         Family History   Problem Relation Age of Onset    Crohn's disease Mother     Diabetes Paternal Aunt     Colon cancer Neg Hx     Ovarian cancer Neg Hx     Breast cancer Neg Hx   "      Social History     Social History    Marital status:      Spouse name: N/A    Number of children: N/A    Years of education: N/A     Occupational History    at home       Social History Main Topics    Smoking status: Never Smoker    Smokeless tobacco: Never Used    Alcohol use No    Drug use: No    Sexual activity: Yes     Partners: Male     Birth control/ protection: Condom     Other Topics Concern    Not on file     Social History Narrative     [facility] [employer]    Nicolasa Mcallister       Current Outpatient Prescriptions   Medication Sig Dispense Refill    arm brace (WRIST BRACE) Misc 1 application by Misc.(Non-Drug; Combo Route) route once daily at 6am. 1 each 0    PRENATAL VIT W-CA,FE,FA,<1 MG, (PRENATAL VITAMIN ORAL) Take by mouth.       No current facility-administered medications for this visit.        Review of patient's allergies indicates:  No Known Allergies         Vitals:    04/02/18 0824   BP: 126/68   Pulse: 72   Resp: 18   Temp: 97.3 °F (36.3 °C)   TempSrc: Oral   SpO2: 100%   Weight: 57.4 kg (126 lb 8.7 oz)   Height: 5' 5" (1.651 m)       Physical Exam   Constitutional: She is oriented to person, place, and time. She appears well-developed and well-nourished. No distress.   HENT:   Head: Normocephalic and atraumatic.   Mouth/Throat: Oropharynx is clear and moist.   Eyes: Conjunctivae are normal. No scleral icterus.   Neck: Normal range of motion.   Cardiovascular: Normal rate, regular rhythm and normal heart sounds.    Pulmonary/Chest: Effort normal and breath sounds normal. No respiratory distress. She has no wheezes. She has no rales.   Abdominal: Soft. Bowel sounds are normal. She exhibits no distension. There is no hepatosplenomegaly. There is no tenderness. No hernia.   Musculoskeletal: She exhibits no edema.   Lymphadenopathy:     She has no cervical adenopathy.   Neurological: She is alert and oriented to person, place, and time.   Skin: " "Skin is warm and dry. No rash noted. She is not diaphoretic.   Psychiatric: She has a normal mood and affect. Her behavior is normal. Her mood appears not anxious.   Nursing note and vitals reviewed.      LABS: I personally reviewed pertinent laboratory findings.    Lab Results   Component Value Date    ALT 20 01/03/2018    AST 21 01/03/2018    GGT 12 05/31/2017    ALKPHOS 51 (L) 01/03/2018    BILITOT 1.8 (H) 01/03/2018       Lab Results   Component Value Date    WBC 9.47 01/03/2018    HGB 12.4 01/03/2018    HCT 37.6 01/03/2018    MCV 88 01/03/2018     01/03/2018       Lab Results   Component Value Date     (L) 01/03/2018    K 3.5 01/03/2018    CL 99 01/03/2018    CO2 27 01/03/2018    BUN 12 01/03/2018    CREATININE 0.7 01/03/2018    CALCIUM 9.1 01/03/2018    ANIONGAP 8 01/03/2018    ESTGFRAFRICA >60 01/03/2018    EGFRNONAA >60 01/03/2018       Lab Results   Component Value Date    HEPAIGM Negative 05/31/2017    HEPBIGM Negative 05/31/2017    HEPCAB Negative 05/31/2017       No results found for: SMOOTHMUSCAB, MITOAB    I personally reviewed all recent lab results.  I personally reviewed imaging studies.    Assessment:  32 y.o. female presenting with     1. Gilbert syndrome    2. Unconjugated hyperbilirubinemia      Patient has asymptomatic, isolated unoconjugated hyperbilirubinemia - likely secondary to Gilbert syndrome.  USG liver/GB: no biliary obstruction. No itching, denies any symptoms.  HB s Ag: neg  HCV Ab: neg    No concerning features for intrahepatic cholestasis of the liver or acute fatty liver of pregnancy. No signs of pre-eclampsia/HELLP - monitored by her Obs.    Recommendation(s):  - when repeat labs, please order "hepatic function panel: which includes direct bilirubin"  - monitor LFTs periodically during pregnancy.    A total of 40 minutes were spent face-to-face with the patient during this encounter and over half of that time was spent on counseling and coordination of care.  We " discussed in depth the nature of the patient's liver disease, the management plan in details. I also educated the patient about lifestyle modifications which may improve hepatic steatosis, overweight/obesity, insulin resistance and high blood pressure issues. I have provided the patient with an opportunity to ask questions and have all questions answered to his satisfaction.     I have sent communication to the referring physician and/or primary care provider.    Marcelle Estes MD  Staff Physician  Hepatology and Liver Transplant  [facility]  [facility]

## 2018-04-08 PROBLEM — R17 TOTAL BILIRUBIN, ELEVATED: Status: RESOLVED | Noted: 2018-01-31 | Resolved: 2018-04-08

## 2018-04-08 PROBLEM — Z34.92 PRENATAL CARE IN SECOND TRIMESTER: Status: RESOLVED | Noted: 2018-02-27 | Resolved: 2018-04-08

## 2018-04-24 ENCOUNTER — ROUTINE PRENATAL (OUTPATIENT)
Dept: OBSTETRICS AND GYNECOLOGY | Facility: CLINIC | Age: 33
End: 2018-04-24
Payer: OTHER GOVERNMENT

## 2018-04-24 VITALS
BODY MASS INDEX: 22.14 KG/M2 | WEIGHT: 133.06 LBS | SYSTOLIC BLOOD PRESSURE: 104 MMHG | DIASTOLIC BLOOD PRESSURE: 66 MMHG

## 2018-04-24 DIAGNOSIS — Z34.92 PRENATAL CARE IN SECOND TRIMESTER: Primary | ICD-10-CM

## 2018-04-24 DIAGNOSIS — R17 ELEVATED BILIRUBIN: ICD-10-CM

## 2018-04-24 PROCEDURE — 99999 PR PBB SHADOW E&M-EST. PATIENT-LVL II: CPT | Mod: PBBFAC,,, | Performed by: ADVANCED PRACTICE MIDWIFE

## 2018-04-24 PROCEDURE — 99212 OFFICE O/P EST SF 10 MIN: CPT | Mod: PBBFAC | Performed by: ADVANCED PRACTICE MIDWIFE

## 2018-04-24 PROCEDURE — 0502F SUBSEQUENT PRENATAL CARE: CPT | Mod: ,,, | Performed by: ADVANCED PRACTICE MIDWIFE

## 2018-04-24 NOTE — PROGRESS NOTES
32 y.o. female  at 22w6d by LMP c/w 7wk US  With family today  Reports + FM, denies VB, LOF, or cramping  Gilbert Syndrome (elevated bilirubin)  -- Followed by hepatology; hepatic function added to upcoming labs per recommendation  Reviewed upcoming 28wk labs, (A POS) and orders placed  Reviewed warning signs, normal FM,  labor precautions and how/when to call.  RTC x 4 wks, call or present sooner prn.     Birth Center Risk Assessment: 0  0- CNM management in ABC  1- CNM management on L&D  2- Consultation with OB to develop plan of care  3- Collaborative CNM/OB management with delivery on L&D  4- Referral or transfer of care to MD

## 2018-05-22 ENCOUNTER — LAB VISIT (OUTPATIENT)
Dept: LAB | Facility: OTHER | Age: 33
End: 2018-05-22
Payer: OTHER GOVERNMENT

## 2018-05-22 ENCOUNTER — ROUTINE PRENATAL (OUTPATIENT)
Dept: OBSTETRICS AND GYNECOLOGY | Facility: CLINIC | Age: 33
End: 2018-05-22
Payer: OTHER GOVERNMENT

## 2018-05-22 VITALS — BODY MASS INDEX: 22.38 KG/M2 | WEIGHT: 134.5 LBS | SYSTOLIC BLOOD PRESSURE: 110 MMHG | DIASTOLIC BLOOD PRESSURE: 60 MMHG

## 2018-05-22 DIAGNOSIS — Z34.92 PRENATAL CARE IN SECOND TRIMESTER: ICD-10-CM

## 2018-05-22 DIAGNOSIS — R17 ELEVATED BILIRUBIN: ICD-10-CM

## 2018-05-22 DIAGNOSIS — Z34.92 PRENATAL CARE IN SECOND TRIMESTER: Primary | ICD-10-CM

## 2018-05-22 LAB
ALBUMIN SERPL BCP-MCNC: 3.2 G/DL
ALP SERPL-CCNC: 55 U/L
ALT SERPL W/O P-5'-P-CCNC: 12 U/L
AST SERPL-CCNC: 18 U/L
BASOPHILS # BLD AUTO: 0.03 K/UL
BASOPHILS NFR BLD: 0.3 %
BILIRUB DIRECT SERPL-MCNC: 0.5 MG/DL
BILIRUB SERPL-MCNC: 1.7 MG/DL
DIFFERENTIAL METHOD: ABNORMAL
EOSINOPHIL # BLD AUTO: 0.1 K/UL
EOSINOPHIL NFR BLD: 0.7 %
ERYTHROCYTE [DISTWIDTH] IN BLOOD BY AUTOMATED COUNT: 13 %
GLUCOSE SERPL-MCNC: 121 MG/DL
HCT VFR BLD AUTO: 36.5 %
HGB BLD-MCNC: 12.3 G/DL
LYMPHOCYTES # BLD AUTO: 1.9 K/UL
LYMPHOCYTES NFR BLD: 17.9 %
MCH RBC QN AUTO: 30.5 PG
MCHC RBC AUTO-ENTMCNC: 33.7 G/DL
MCV RBC AUTO: 91 FL
MONOCYTES # BLD AUTO: 0.4 K/UL
MONOCYTES NFR BLD: 3.4 %
NEUTROPHILS # BLD AUTO: 8.2 K/UL
NEUTROPHILS NFR BLD: 77.2 %
PLATELET # BLD AUTO: 227 K/UL
PMV BLD AUTO: 10.2 FL
PROT SERPL-MCNC: 6.7 G/DL
RBC # BLD AUTO: 4.03 M/UL
WBC # BLD AUTO: 10.58 K/UL

## 2018-05-22 PROCEDURE — 82950 GLUCOSE TEST: CPT

## 2018-05-22 PROCEDURE — 99212 OFFICE O/P EST SF 10 MIN: CPT | Mod: PBBFAC | Performed by: ADVANCED PRACTICE MIDWIFE

## 2018-05-22 PROCEDURE — 85025 COMPLETE CBC W/AUTO DIFF WBC: CPT

## 2018-05-22 PROCEDURE — 80076 HEPATIC FUNCTION PANEL: CPT

## 2018-05-22 PROCEDURE — 99999 PR PBB SHADOW E&M-EST. PATIENT-LVL II: CPT | Mod: PBBFAC,,, | Performed by: ADVANCED PRACTICE MIDWIFE

## 2018-05-22 PROCEDURE — 0502F SUBSEQUENT PRENATAL CARE: CPT | Mod: ,,, | Performed by: ADVANCED PRACTICE MIDWIFE

## 2018-05-22 PROCEDURE — 36415 COLL VENOUS BLD VENIPUNCTURE: CPT

## 2018-05-22 NOTE — PROGRESS NOTES
32 y.o. female  at 26w6d  With her mom today   Reports + FM, denies VB, LOF or CTX  Gilbert Syndrome.   -- Followed by hepatology  -- Labs today with 28 wk labs. A POS.   Offer Tdap at next visit please  Reviewed warning signs, normal FKCs,  labor precautions and how/when to call.  RTC x 2 wks, call or present sooner prn.     Birth Center Risk Assessment: 0  0- CNM management in ABC  1- CNM management on L&D  2- Consultation with OB to develop plan of care  3- Collaborative CNM/OB management with delivery on L&D  4- Referral or transfer of care to MD

## 2018-06-06 ENCOUNTER — ROUTINE PRENATAL (OUTPATIENT)
Dept: OBSTETRICS AND GYNECOLOGY | Facility: CLINIC | Age: 33
End: 2018-06-06
Payer: OTHER GOVERNMENT

## 2018-06-06 VITALS
SYSTOLIC BLOOD PRESSURE: 100 MMHG | BODY MASS INDEX: 22.23 KG/M2 | DIASTOLIC BLOOD PRESSURE: 64 MMHG | WEIGHT: 133.63 LBS

## 2018-06-06 DIAGNOSIS — Z34.90 PREGNANCY, UNSPECIFIED GESTATIONAL AGE: Primary | ICD-10-CM

## 2018-06-06 PROCEDURE — 99999 PR PBB SHADOW E&M-EST. PATIENT-LVL II: CPT | Mod: PBBFAC,,, | Performed by: ADVANCED PRACTICE MIDWIFE

## 2018-06-06 PROCEDURE — 0502F SUBSEQUENT PRENATAL CARE: CPT | Mod: ,,, | Performed by: ADVANCED PRACTICE MIDWIFE

## 2018-06-06 PROCEDURE — 99212 OFFICE O/P EST SF 10 MIN: CPT | Mod: PBBFAC | Performed by: ADVANCED PRACTICE MIDWIFE

## 2018-06-06 NOTE — PROGRESS NOTES
32 y.o. female  at 29w0d by sure LMP c/w 7wk4d US here today with both children.   Reports + FM, denies VB, LOF or CTX  Pt reports she has been feeling overly tired / a little rundown for the last few weeks but has a beach trip planned without children for next week and hopes she'll get a chance to rest.   TWG: 15 lbs   Some weight loss since last visit. Measuring borderline S<D - will reassess @ nv and consider growth sono if continued trend.   Reviewed 28wk labs today (A POS)  Gilbert syndrome: followed by Hepatology. Labs stable and to be repeated with 36 wk labs.   N/A Rhogam   Ordered Tdap- pt intends to have childcare for next visit and get Tdap when she's not parenting solo.  Reviewed warning signs, normal FKCs,  labor precautions and how/when to call.  RTC x 2 wks, call or present sooner prn.   Birth Center Risk Assessment: 0- Meets birth center guidelines    0- CNM management in ABC  1- CNM management on L&D  2- Consultation with OB to develop  plan of care  3- Collaborative CNM/OB management with delivery on L&D  4- Referral of care to MD saravia  5- Permanent referral of care to MD

## 2018-06-19 ENCOUNTER — TELEPHONE (OUTPATIENT)
Dept: DERMATOLOGY | Facility: CLINIC | Age: 33
End: 2018-06-19

## 2018-06-19 ENCOUNTER — CLINICAL SUPPORT (OUTPATIENT)
Dept: OBSTETRICS AND GYNECOLOGY | Facility: CLINIC | Age: 33
End: 2018-06-19
Payer: OTHER GOVERNMENT

## 2018-06-19 VITALS
DIASTOLIC BLOOD PRESSURE: 68 MMHG | WEIGHT: 136.25 LBS | BODY MASS INDEX: 22.67 KG/M2 | SYSTOLIC BLOOD PRESSURE: 106 MMHG

## 2018-06-19 DIAGNOSIS — O26.843 UTERINE SIZE-DATE DISCREPANCY IN THIRD TRIMESTER: Primary | ICD-10-CM

## 2018-06-19 PROCEDURE — 99211 OFF/OP EST MAY X REQ PHY/QHP: CPT | Mod: PBBFAC

## 2018-06-19 PROCEDURE — 99212 OFFICE O/P EST SF 10 MIN: CPT | Mod: PBBFAC,27,25 | Performed by: ADVANCED PRACTICE MIDWIFE

## 2018-06-19 PROCEDURE — 0502F SUBSEQUENT PRENATAL CARE: CPT | Mod: ,,, | Performed by: ADVANCED PRACTICE MIDWIFE

## 2018-06-19 PROCEDURE — 99999 PR PBB SHADOW E&M-EST. PATIENT-LVL II: CPT | Mod: PBBFAC,,, | Performed by: ADVANCED PRACTICE MIDWIFE

## 2018-06-19 PROCEDURE — 90471 IMMUNIZATION ADMIN: CPT | Mod: PBBFAC

## 2018-06-19 PROCEDURE — 99999 PR PBB SHADOW E&M-EST. PATIENT-LVL I: CPT | Mod: PBBFAC,,,

## 2018-06-19 NOTE — TELEPHONE ENCOUNTER
Spoke with pt about a release of medical records. Pt states [place] dermat[place] in [place] and was trying to get those records faxed to us before her appointment tomorrow morning. I assured pt if we did not get records by them we can do a medical release form that she can sign and we can fax over to dermatologist to get those records.----- Message from Karissa Roche sent at 6/19/2018  3:17 PM CDT -----  Contact: patient  Please call above patient has question for the nurse

## 2018-06-20 ENCOUNTER — OFFICE VISIT (OUTPATIENT)
Dept: DERMATOLOGY | Facility: CLINIC | Age: 33
End: 2018-06-20
Payer: OTHER GOVERNMENT

## 2018-06-20 DIAGNOSIS — L55.9 SUNBURN: ICD-10-CM

## 2018-06-20 DIAGNOSIS — Z12.83 SCREENING EXAM FOR SKIN CANCER: Primary | ICD-10-CM

## 2018-06-20 DIAGNOSIS — L81.4 LENTIGO: ICD-10-CM

## 2018-06-20 DIAGNOSIS — D22.9 MULTIPLE BENIGN NEVI: ICD-10-CM

## 2018-06-20 PROCEDURE — 99203 OFFICE O/P NEW LOW 30 MIN: CPT | Mod: S$PBB,,, | Performed by: DERMATOLOGY

## 2018-06-20 PROCEDURE — 99212 OFFICE O/P EST SF 10 MIN: CPT | Mod: PBBFAC | Performed by: DERMATOLOGY

## 2018-06-20 PROCEDURE — 99999 PR PBB SHADOW E&M-EST. PATIENT-LVL II: CPT | Mod: PBBFAC,,, | Performed by: DERMATOLOGY

## 2018-06-20 NOTE — PROGRESS NOTES
"  Subjective:       Patient ID:  Nicolasa Wagoner is a 32 y.o. female who presents for   Chief Complaint   Patient presents with    Skin Check     TBSE    Lesion     mid back     New patient here for TBSE.   No history of nmsc or mm.   Has had moles removed in the past, all benign.   Lesions of concern: new "mole" on back, growing, raised, not tender, not bleeding; mole on inner thigh, changing in size   Of note, patient is currently pregnant       Lesion         Review of Systems   Skin: Positive for daily sunscreen use and activity-related sunscreen use. Negative for recent sunburn.   Hematologic/Lymphatic: Does not bruise/bleed easily.        Objective:    Physical Exam   Constitutional: She appears well-developed and well-nourished. No distress.   Neurological: She is alert and oriented to person, place, and time. She is not disoriented.   Psychiatric: She has a normal mood and affect.   Skin:   Areas Examined (abnormalities noted in diagram):   Scalp / Hair Palpated and Inspected  Head / Face Inspection Performed  Neck Inspection Performed  Chest / Axilla Inspection Performed  Abdomen Inspection Performed  Genitals / Buttocks / Groin Inspection Performed  Back Inspection Performed  RUE Inspected  LUE Inspection Performed  RLE Inspected  LLE Inspection Performed  Nails and Digits Inspection Performed              Diagram Legend     Erythematous scaling macule/papule c/w actinic keratosis       Vascular papule c/w angioma      Pigmented verrucoid papule/plaque c/w seborrheic keratosis      Yellow umbilicated papule c/w sebaceous hyperplasia      Irregularly shaped tan macule c/w lentigo     1-2 mm smooth white papules consistent with Milia      Movable subcutaneous cyst with punctum c/w epidermal inclusion cyst      Subcutaneous movable cyst c/w pilar cyst      Firm pink to brown papule c/w dermatofibroma      Pedunculated fleshy papule(s) c/w skin tag(s)      Evenly pigmented macule c/w junctional nevus    "  Mildly variegated pigmented, slightly irregular-bordered macule c/w mildly atypical nevus      Flesh colored to evenly pigmented papule c/w intradermal nevus       Pink pearly papule/plaque c/w basal cell carcinoma      Erythematous hyperkeratotic cursted plaque c/w SCC      Surgical scar with no sign of skin cancer recurrence      Open and closed comedones      Inflammatory papules and pustules      Verrucoid papule consistent consistent with wart     Erythematous eczematous patches and plaques     Dystrophic onycholytic nail with subungual debris c/w onychomycosis     Umbilicated papule    Erythematous-base heme-crusted tan verrucoid plaque consistent with inflamed seborrheic keratosis     Erythematous Silvery Scaling Plaque c/w Psoriasis     See annotation      Assessment / Plan:        Screening exam for skin cancer    Total body skin examination performed today including at least 12 points as noted in physical examination. No lesions suspicious for malignancy noted.    Multiple benign nevi  Discussed ABCDE's of nevi.  Monitor for new mole or moles that are becoming bigger, darker, irritated, or developing irregular borders.     Sunburn    Patient instructed in importance in daily sun protection of at least spf 30. Sun avoidance and topical protection discussed.     Patient encouraged to wear hat for all outdoor exposure.     Also discussed sun protective clothing.    Lentigo  This is a benign hyperpigmented sun induced lesion. Daily sun protection will reduce the number of new lesions. Treatment of these benign lesions are considered cosmetic.             Follow-up if symptoms worsen or fail to improve.

## 2018-06-25 ENCOUNTER — OFFICE VISIT (OUTPATIENT)
Dept: MATERNAL FETAL MEDICINE | Facility: CLINIC | Age: 33
End: 2018-06-25
Payer: OTHER GOVERNMENT

## 2018-06-25 DIAGNOSIS — O26.843 UTERINE SIZE-DATE DISCREPANCY IN THIRD TRIMESTER: ICD-10-CM

## 2018-06-25 PROCEDURE — 99499 UNLISTED E&M SERVICE: CPT | Mod: S$PBB,,, | Performed by: OBSTETRICS & GYNECOLOGY

## 2018-06-25 PROCEDURE — 76816 OB US FOLLOW-UP PER FETUS: CPT | Mod: 26,S$PBB,, | Performed by: OBSTETRICS & GYNECOLOGY

## 2018-06-25 PROCEDURE — 76816 OB US FOLLOW-UP PER FETUS: CPT | Mod: PBBFAC | Performed by: OBSTETRICS & GYNECOLOGY

## 2018-06-25 NOTE — PROGRESS NOTES
OB Ultrasound done today (see PDF version under imaging tab). Please see full report under imaging tab.

## 2018-07-06 ENCOUNTER — ROUTINE PRENATAL (OUTPATIENT)
Dept: OBSTETRICS AND GYNECOLOGY | Facility: CLINIC | Age: 33
End: 2018-07-06
Payer: OTHER GOVERNMENT

## 2018-07-06 VITALS — DIASTOLIC BLOOD PRESSURE: 60 MMHG | WEIGHT: 136.69 LBS | BODY MASS INDEX: 22.75 KG/M2 | SYSTOLIC BLOOD PRESSURE: 94 MMHG

## 2018-07-06 DIAGNOSIS — Z34.90 PREGNANCY, UNSPECIFIED GESTATIONAL AGE: Primary | ICD-10-CM

## 2018-07-06 PROCEDURE — 0502F SUBSEQUENT PRENATAL CARE: CPT | Mod: ,,, | Performed by: ADVANCED PRACTICE MIDWIFE

## 2018-07-06 PROCEDURE — 99999 PR PBB SHADOW E&M-EST. PATIENT-LVL II: CPT | Mod: PBBFAC,,, | Performed by: ADVANCED PRACTICE MIDWIFE

## 2018-07-06 PROCEDURE — 99212 OFFICE O/P EST SF 10 MIN: CPT | Mod: PBBFAC | Performed by: ADVANCED PRACTICE MIDWIFE

## 2018-07-06 NOTE — PROGRESS NOTES
32 y.o. female  at 33w2d   Here with children  Reports + FM, denies VB, LOF or CTX  Doing well without concerns  TW lbs   Pt states she's had company for the last few days and things are settling down now since they left yesterday.  Encouraged protein rich snacks and adequate rest.   Reviewed recent ultrasound - EFW 19% (A POS)  Tdap received.   Reviewed upcoming 36wk labs  Reviewed warning signs, normal FKCs,  labor precautions and how/when to call.  RTC x 2 wks, call or present sooner prn.     Birth Center Risk Assessment: 0- Meets birth center guidelines    0- CNM management in ABC  1- CNM management on L&D  2- Consultation with OB to develop  plan of care  3- Collaborative CNM/OB management with delivery on L&D  4- Permanent referral of care to MD

## 2018-07-20 ENCOUNTER — TELEPHONE (OUTPATIENT)
Dept: OBSTETRICS AND GYNECOLOGY | Facility: CLINIC | Age: 33
End: 2018-07-20

## 2018-07-20 NOTE — TELEPHONE ENCOUNTER
Pt rescheduled for 0945 on 7/24      ----- Message from Rosa Maria Mlils sent at 7/20/2018  9:13 AM CDT -----  Contact: pt            Name of Who is Calling: YOLI ALBRECHT [[id number]]      What is the request in detail: pt calling to speak with a nurse in regards to possibly coming in on Tuesday 7/24.. Please advise      Can the clinic reply by [facility]: no      What Number to Call Back if not in [facility]: [phone]

## 2018-07-24 ENCOUNTER — ROUTINE PRENATAL (OUTPATIENT)
Dept: OBSTETRICS AND GYNECOLOGY | Facility: CLINIC | Age: 33
End: 2018-07-24
Payer: OTHER GOVERNMENT

## 2018-07-24 ENCOUNTER — LAB VISIT (OUTPATIENT)
Dept: LAB | Facility: OTHER | Age: 33
End: 2018-07-24
Payer: OTHER GOVERNMENT

## 2018-07-24 VITALS
WEIGHT: 139.75 LBS | BODY MASS INDEX: 23.26 KG/M2 | SYSTOLIC BLOOD PRESSURE: 104 MMHG | DIASTOLIC BLOOD PRESSURE: 68 MMHG

## 2018-07-24 DIAGNOSIS — E80.4 GILBERT SYNDROME: ICD-10-CM

## 2018-07-24 DIAGNOSIS — Z34.93 PRENATAL CARE IN THIRD TRIMESTER: Primary | ICD-10-CM

## 2018-07-24 DIAGNOSIS — Z34.93 PRENATAL CARE IN THIRD TRIMESTER: ICD-10-CM

## 2018-07-24 LAB
ALBUMIN SERPL BCP-MCNC: 3 G/DL
ALBUMIN SERPL BCP-MCNC: 3 G/DL
ALP SERPL-CCNC: 105 U/L
ALP SERPL-CCNC: 105 U/L
ALT SERPL W/O P-5'-P-CCNC: 9 U/L
ALT SERPL W/O P-5'-P-CCNC: 9 U/L
ANION GAP SERPL CALC-SCNC: 8 MMOL/L
AST SERPL-CCNC: 16 U/L
AST SERPL-CCNC: 16 U/L
BILIRUB DIRECT SERPL-MCNC: 0.3 MG/DL
BILIRUB SERPL-MCNC: 0.9 MG/DL
BILIRUB SERPL-MCNC: 0.9 MG/DL
BUN SERPL-MCNC: 9 MG/DL
CALCIUM SERPL-MCNC: 8.7 MG/DL
CHLORIDE SERPL-SCNC: 105 MMOL/L
CO2 SERPL-SCNC: 22 MMOL/L
CREAT SERPL-MCNC: 0.7 MG/DL
EST. GFR  (AFRICAN AMERICAN): >60 ML/MIN/1.73 M^2
EST. GFR  (NON AFRICAN AMERICAN): >60 ML/MIN/1.73 M^2
GLUCOSE SERPL-MCNC: 98 MG/DL
POTASSIUM SERPL-SCNC: 4.1 MMOL/L
PROT SERPL-MCNC: 6.8 G/DL
PROT SERPL-MCNC: 6.8 G/DL
RPR SER QL: NORMAL
SODIUM SERPL-SCNC: 135 MMOL/L

## 2018-07-24 PROCEDURE — 80053 COMPREHEN METABOLIC PANEL: CPT

## 2018-07-24 PROCEDURE — 87081 CULTURE SCREEN ONLY: CPT

## 2018-07-24 PROCEDURE — 86703 HIV-1/HIV-2 1 RESULT ANTBDY: CPT

## 2018-07-24 PROCEDURE — 99999 PR PBB SHADOW E&M-EST. PATIENT-LVL III: CPT | Mod: PBBFAC,,, | Performed by: ADVANCED PRACTICE MIDWIFE

## 2018-07-24 PROCEDURE — 0502F SUBSEQUENT PRENATAL CARE: CPT | Mod: ,,, | Performed by: ADVANCED PRACTICE MIDWIFE

## 2018-07-24 PROCEDURE — 36415 COLL VENOUS BLD VENIPUNCTURE: CPT

## 2018-07-24 PROCEDURE — 99213 OFFICE O/P EST LOW 20 MIN: CPT | Mod: PBBFAC | Performed by: ADVANCED PRACTICE MIDWIFE

## 2018-07-24 PROCEDURE — 86592 SYPHILIS TEST NON-TREP QUAL: CPT

## 2018-07-24 NOTE — MEDICAL/APP STUDENT
S:   Reports + FM, denies VB, LOF, or CTX/cramping  Unaccompanied today  Doing well without concerns. Patient requests SVE.    O:  Blood pressure: 104/68  Today's weight: 63.4 kg  TW # for pre preg BMI of 19.6  Fundal height: 33 cm (interval growth appropriate, US for S<D 18 EFW 19%ile)  FHT: 150 bpm   Urine: negative protein/negative glucose  Edema: none  Presentation: vertex  SVE: /-3  GBS collected today    A/P:  32 y.o. female  at 35w6d by LMP c/w 7w4d US  -- Reviewed prenatal labs and ultrasounds. Orders today: GBS  -- Reviewed [facility] recommendation for repeat HIV/RPR today; she agrees. Orders placed.  -- Reviewed warning signs, normal FM/FKCs, /term labor precautions and how/when to call.  -- RTC x 1 wks, call or present sooner prn.     Hx of Gilbert syndrome  -- Repeat CMP/LFTs today    Birth Center Risk Assessment: 0- Meets birth center guidelines    0- CNM management in ABC  1- CNM management on L&D  2- Consultation with OB to develop  plan of care  3- Collaborative CNM/OB management with delivery on L&D  4- Referral of care to MD saravia  5- Permanent referral of care to MD Love Song, RN, [facility]  2018  2:13 PM

## 2018-07-24 NOTE — PROGRESS NOTES
Please see documentation by student CNALONSO Song I was present for evaluation and agree with documentation    Gilbert syndrome. Labs today with 3T labs. She agrees.  S<D. Reviewed recent EFW from US late June. Interval growth appropriate. Consider repeat US for growth prn      Ashley Marino CNM/NP  Certified Nurse Midwife/Nurse Practitioner  7/24/2018 2:22 PM

## 2018-07-25 LAB — HIV 1+2 AB+HIV1 P24 AG SERPL QL IA: NEGATIVE

## 2018-07-26 LAB — BACTERIA SPEC AEROBE CULT: NORMAL

## 2018-08-01 ENCOUNTER — ROUTINE PRENATAL (OUTPATIENT)
Dept: OBSTETRICS AND GYNECOLOGY | Facility: CLINIC | Age: 33
End: 2018-08-01
Payer: OTHER GOVERNMENT

## 2018-08-01 VITALS
SYSTOLIC BLOOD PRESSURE: 104 MMHG | WEIGHT: 141.13 LBS | BODY MASS INDEX: 23.48 KG/M2 | DIASTOLIC BLOOD PRESSURE: 70 MMHG

## 2018-08-01 DIAGNOSIS — O26.843 UTERINE SIZE-DATE DISCREPANCY IN THIRD TRIMESTER: Primary | ICD-10-CM

## 2018-08-01 PROCEDURE — 99999 PR PBB SHADOW E&M-EST. PATIENT-LVL II: CPT | Mod: PBBFAC,,, | Performed by: ADVANCED PRACTICE MIDWIFE

## 2018-08-01 PROCEDURE — 0502F SUBSEQUENT PRENATAL CARE: CPT | Mod: ,,, | Performed by: ADVANCED PRACTICE MIDWIFE

## 2018-08-01 PROCEDURE — 99212 OFFICE O/P EST SF 10 MIN: CPT | Mod: PBBFAC | Performed by: ADVANCED PRACTICE MIDWIFE

## 2018-08-01 NOTE — PROGRESS NOTES
32 y.o. female  at 37w0d   With Benny and the children today  Reports + FM, denies VB, LOF or regular CTX  S<D continues. F/U offered and ordered  Gilbert syndrome, labs stable  TW lbs   Reviewed GBS neg  Reviewed repeat HIV/RPR neg/neg  Reviewed warning signs, normal FKCs, labor precautions and how/when to call.  RTC x 1 wks, call or present sooner prn.   Birth Center Risk Assessment: 0  0- CNM management in ABC  1- CNM management on L&D  2- Consultation with OB to develop plan of care  3- Collaborative CNM/OB management with delivery on L&D  4- Referral or transfer of care to MD

## 2018-08-07 ENCOUNTER — OFFICE VISIT (OUTPATIENT)
Dept: MATERNAL FETAL MEDICINE | Facility: CLINIC | Age: 33
End: 2018-08-07
Payer: OTHER GOVERNMENT

## 2018-08-07 DIAGNOSIS — O26.843 UTERINE SIZE-DATE DISCREPANCY IN THIRD TRIMESTER: ICD-10-CM

## 2018-08-07 DIAGNOSIS — Z36.4 ANTENATAL SCREENING FOR FETAL GROWTH RETARDATION USING ULTRASONICS: ICD-10-CM

## 2018-08-07 PROCEDURE — 76816 OB US FOLLOW-UP PER FETUS: CPT | Mod: PBBFAC | Performed by: OBSTETRICS & GYNECOLOGY

## 2018-08-07 PROCEDURE — 99499 UNLISTED E&M SERVICE: CPT | Mod: S$PBB,,, | Performed by: OBSTETRICS & GYNECOLOGY

## 2018-08-07 PROCEDURE — 76816 OB US FOLLOW-UP PER FETUS: CPT | Mod: 26,S$PBB,, | Performed by: OBSTETRICS & GYNECOLOGY

## 2018-08-09 ENCOUNTER — ROUTINE PRENATAL (OUTPATIENT)
Dept: OBSTETRICS AND GYNECOLOGY | Facility: CLINIC | Age: 33
End: 2018-08-09
Payer: OTHER GOVERNMENT

## 2018-08-09 VITALS
SYSTOLIC BLOOD PRESSURE: 102 MMHG | WEIGHT: 142.31 LBS | BODY MASS INDEX: 23.68 KG/M2 | DIASTOLIC BLOOD PRESSURE: 70 MMHG

## 2018-08-09 DIAGNOSIS — Z34.83 ENCOUNTER FOR SUPERVISION OF OTHER NORMAL PREGNANCY IN THIRD TRIMESTER: Primary | ICD-10-CM

## 2018-08-09 PROCEDURE — 99212 OFFICE O/P EST SF 10 MIN: CPT | Mod: PBBFAC | Performed by: ADVANCED PRACTICE MIDWIFE

## 2018-08-09 PROCEDURE — 99999 PR PBB SHADOW E&M-EST. PATIENT-LVL II: CPT | Mod: PBBFAC,,, | Performed by: ADVANCED PRACTICE MIDWIFE

## 2018-08-09 PROCEDURE — 99213 OFFICE O/P EST LOW 20 MIN: CPT | Mod: S$PBB,,, | Performed by: ADVANCED PRACTICE MIDWIFE

## 2018-08-09 NOTE — PROGRESS NOTES
Doing well today  Alone for this visit  No C/O  Reviewed labs but she already saw them   Discussed GBS NEG   Had MFM U/S this week  Baby in 25th percentile  S<D

## 2018-08-16 ENCOUNTER — ROUTINE PRENATAL (OUTPATIENT)
Dept: OBSTETRICS AND GYNECOLOGY | Facility: CLINIC | Age: 33
End: 2018-08-16
Payer: OTHER GOVERNMENT

## 2018-08-16 VITALS — DIASTOLIC BLOOD PRESSURE: 66 MMHG | BODY MASS INDEX: 23.7 KG/M2 | SYSTOLIC BLOOD PRESSURE: 106 MMHG | WEIGHT: 142.44 LBS

## 2018-08-16 DIAGNOSIS — Z34.93 PRENATAL CARE IN THIRD TRIMESTER: Primary | ICD-10-CM

## 2018-08-16 PROCEDURE — 99212 OFFICE O/P EST SF 10 MIN: CPT | Mod: PBBFAC | Performed by: ADVANCED PRACTICE MIDWIFE

## 2018-08-16 PROCEDURE — 99999 PR PBB SHADOW E&M-EST. PATIENT-LVL II: CPT | Mod: PBBFAC,,, | Performed by: ADVANCED PRACTICE MIDWIFE

## 2018-08-16 PROCEDURE — 0502F SUBSEQUENT PRENATAL CARE: CPT | Mod: ,,, | Performed by: ADVANCED PRACTICE MIDWIFE

## 2018-08-17 NOTE — PROGRESS NOTES
32 y.o. female  at 39w1d   With Benny today  Her parents are in town and have the other two children  Reports + FM, denies VB, LOF or regular CTX  Feeling ready for baby!  TW lbs   S<D. Recent US WNL.   Gilbert syndrome, labs stable  Reviewed warning signs, normal FKCs, labor precautions and how/when to call.  RTC x 1 wks, call or present sooner prn.   Birth Center Risk Assessment: 0  0- CNM management in ABC  1- CNM management on L&D  2- Consultation with OB to develop plan of care  3- Collaborative CNM/OB management with delivery on L&D  4- Referral or transfer of care to MD   3

## 2018-08-22 ENCOUNTER — ANESTHESIA (OUTPATIENT)
Dept: OBSTETRICS AND GYNECOLOGY | Facility: OTHER | Age: 33
End: 2018-08-22

## 2018-08-22 ENCOUNTER — ROUTINE PRENATAL (OUTPATIENT)
Dept: OBSTETRICS AND GYNECOLOGY | Facility: CLINIC | Age: 33
End: 2018-08-22
Payer: OTHER GOVERNMENT

## 2018-08-22 ENCOUNTER — TELEPHONE (OUTPATIENT)
Dept: OBSTETRICS AND GYNECOLOGY | Facility: HOSPITAL | Age: 33
End: 2018-08-22

## 2018-08-22 ENCOUNTER — HOSPITAL ENCOUNTER (INPATIENT)
Facility: OTHER | Age: 33
LOS: 2 days | Discharge: HOME | End: 2018-08-24
Attending: OBSTETRICS & GYNECOLOGY | Admitting: OBSTETRICS & GYNECOLOGY
Payer: OTHER GOVERNMENT

## 2018-08-22 ENCOUNTER — ANESTHESIA EVENT (OUTPATIENT)
Dept: OBSTETRICS AND GYNECOLOGY | Facility: OTHER | Age: 33
End: 2018-08-22

## 2018-08-22 VITALS
BODY MASS INDEX: 23.55 KG/M2 | WEIGHT: 141.56 LBS | DIASTOLIC BLOOD PRESSURE: 68 MMHG | SYSTOLIC BLOOD PRESSURE: 104 MMHG

## 2018-08-22 DIAGNOSIS — Z34.93 PRENATAL CARE IN THIRD TRIMESTER: Primary | ICD-10-CM

## 2018-08-22 DIAGNOSIS — Z37.9 NORMAL LABOR: Primary | ICD-10-CM

## 2018-08-22 PROBLEM — Z34.92 PRENATAL CARE IN SECOND TRIMESTER: Status: RESOLVED | Noted: 2018-02-27 | Resolved: 2018-08-22

## 2018-08-22 LAB
ALBUMIN SERPL BCP-MCNC: 3 G/DL
ALBUMIN SERPL BCP-MCNC: 3 G/DL
ALP SERPL-CCNC: 140 U/L
ALP SERPL-CCNC: 140 U/L
ALT SERPL W/O P-5'-P-CCNC: 11 U/L
ALT SERPL W/O P-5'-P-CCNC: 11 U/L
ANION GAP SERPL CALC-SCNC: 12 MMOL/L
AST SERPL-CCNC: 19 U/L
AST SERPL-CCNC: 19 U/L
BASOPHILS # BLD AUTO: 0.01 K/UL
BASOPHILS NFR BLD: 0.1 %
BILIRUB DIRECT SERPL-MCNC: 0.5 MG/DL
BILIRUB SERPL-MCNC: 1.7 MG/DL
BILIRUB SERPL-MCNC: 1.7 MG/DL
BUN SERPL-MCNC: 12 MG/DL
CALCIUM SERPL-MCNC: 8.5 MG/DL
CHLORIDE SERPL-SCNC: 101 MMOL/L
CO2 SERPL-SCNC: 19 MMOL/L
CREAT SERPL-MCNC: 0.8 MG/DL
DIFFERENTIAL METHOD: ABNORMAL
EOSINOPHIL # BLD AUTO: 0 K/UL
EOSINOPHIL NFR BLD: 0 %
ERYTHROCYTE [DISTWIDTH] IN BLOOD BY AUTOMATED COUNT: 13.1 %
EST. GFR  (AFRICAN AMERICAN): >60 ML/MIN/1.73 M^2
EST. GFR  (NON AFRICAN AMERICAN): >60 ML/MIN/1.73 M^2
GLUCOSE SERPL-MCNC: 97 MG/DL
HCT VFR BLD AUTO: 37.3 %
HGB BLD-MCNC: 12.7 G/DL
LYMPHOCYTES # BLD AUTO: 1.1 K/UL
LYMPHOCYTES NFR BLD: 5.7 %
MCH RBC QN AUTO: 30 PG
MCHC RBC AUTO-ENTMCNC: 34 G/DL
MCV RBC AUTO: 88 FL
MONOCYTES # BLD AUTO: 0.6 K/UL
MONOCYTES NFR BLD: 3.1 %
NEUTROPHILS # BLD AUTO: 16.8 K/UL
NEUTROPHILS NFR BLD: 90.8 %
PLATELET # BLD AUTO: 202 K/UL
PMV BLD AUTO: 10.4 FL
POTASSIUM SERPL-SCNC: 3.8 MMOL/L
PROT SERPL-MCNC: 6.9 G/DL
PROT SERPL-MCNC: 6.9 G/DL
RBC # BLD AUTO: 4.23 M/UL
SODIUM SERPL-SCNC: 132 MMOL/L
WBC # BLD AUTO: 18.52 K/UL

## 2018-08-22 PROCEDURE — 86901 BLOOD TYPING SEROLOGIC RH(D): CPT

## 2018-08-22 PROCEDURE — 11000001 HC ACUTE MED/SURG PRIVATE ROOM

## 2018-08-22 PROCEDURE — 99283 EMERGENCY DEPT VISIT LOW MDM: CPT | Mod: 25,,, | Performed by: OBSTETRICS & GYNECOLOGY

## 2018-08-22 PROCEDURE — 85025 COMPLETE CBC W/AUTO DIFF WBC: CPT

## 2018-08-22 PROCEDURE — 59025 FETAL NON-STRESS TEST: CPT | Mod: 26,,, | Performed by: OBSTETRICS & GYNECOLOGY

## 2018-08-22 PROCEDURE — 99212 OFFICE O/P EST SF 10 MIN: CPT | Mod: PBBFAC,25 | Performed by: ADVANCED PRACTICE MIDWIFE

## 2018-08-22 PROCEDURE — 80053 COMPREHEN METABOLIC PANEL: CPT

## 2018-08-22 PROCEDURE — 4A0HXCZ MEASUREMENT OF PRODUCTS OF CONCEPTION, CARDIAC RATE, EXTERNAL APPROACH: ICD-10-PCS | Performed by: OBSTETRICS & GYNECOLOGY

## 2018-08-22 PROCEDURE — 0502F SUBSEQUENT PRENATAL CARE: CPT | Mod: ,,, | Performed by: ADVANCED PRACTICE MIDWIFE

## 2018-08-22 PROCEDURE — 59025 FETAL NON-STRESS TEST: CPT

## 2018-08-22 PROCEDURE — 99285 EMERGENCY DEPT VISIT HI MDM: CPT | Mod: 25,27

## 2018-08-22 PROCEDURE — 99999 PR PBB SHADOW E&M-EST. PATIENT-LVL II: CPT | Mod: PBBFAC,,, | Performed by: ADVANCED PRACTICE MIDWIFE

## 2018-08-22 RX ORDER — SODIUM CHLORIDE 9 MG/ML
INJECTION, SOLUTION INTRAVENOUS
Status: DISCONTINUED | OUTPATIENT
Start: 2018-08-23 | End: 2018-08-23

## 2018-08-22 RX ORDER — ONDANSETRON 8 MG/1
8 TABLET, ORALLY DISINTEGRATING ORAL EVERY 8 HOURS PRN
Status: DISCONTINUED | OUTPATIENT
Start: 2018-08-23 | End: 2018-08-23

## 2018-08-22 RX ORDER — SODIUM CHLORIDE, SODIUM LACTATE, POTASSIUM CHLORIDE, CALCIUM CHLORIDE 600; 310; 30; 20 MG/100ML; MG/100ML; MG/100ML; MG/100ML
INJECTION, SOLUTION INTRAVENOUS CONTINUOUS
Status: DISCONTINUED | OUTPATIENT
Start: 2018-08-23 | End: 2018-08-23

## 2018-08-22 NOTE — PROGRESS NOTES
33 y.o. female  at 40w0d  With Benny today  Reports + FM, denies VB, LOF or regular CTX  Doing well without OB concerns - requests SVE today  Gilbert syndrome; recent labs stable  S<D continues; recent US WNL  Discussed postdates management and IOL - she prefers to RTC on Monday and will let us know then  Will need to set up either PNT or IOL at next visit if still pregnant   Reviewed warning signs, normal FKCs, labor precautions and how/when to call.  RTC x 5 days, call or present sooner prn.   Birth Center Risk Assessment: 0  0- CNM management in ABC  1- CNM management on L&D  2- Consultation with OB to develop plan of care  3- Collaborative CNM/OB management with delivery on L&D  4- Referral or transfer of care to MD

## 2018-08-22 NOTE — TELEPHONE ENCOUNTER
Called pt back.  She sounds calm and able to talk evenly and easily, does not sound distressed.  Reports contractions becoming regular, needs to breath through them at times, but otherwise still mild.  Denies LOF or VB. Reports good FM.  Reviewed labor precautions and offered evaluation. 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 Encouraged oral hydration and regular diet as tolerated. Reviewed warning s/s.    ----- Message from Tavo Villa sent at 8/22/2018  4:49 PM CDT -----  Contact: James ()    Name of Who is Calling: James ()      What is the request in detail: Patient is having contractions about 5 mins apart 30 mins in length and growing and wanted to check in      Can the clinic reply by [facility]: no      What Number to Call Back if not in [facility]: [phone]

## 2018-08-23 PROBLEM — Z37.9 NORMAL LABOR: Status: RESOLVED | Noted: 2018-08-22 | Resolved: 2018-08-23

## 2018-08-23 LAB
ABO + RH BLD: NORMAL
ANISOCYTOSIS BLD QL SMEAR: SLIGHT
BASOPHILS # BLD AUTO: 0.01 K/UL
BASOPHILS # BLD AUTO: 0.02 K/UL
BASOPHILS NFR BLD: 0 %
BASOPHILS NFR BLD: 0.1 %
BLD GP AB SCN CELLS X3 SERPL QL: NORMAL
DIFFERENTIAL METHOD: ABNORMAL
DIFFERENTIAL METHOD: ABNORMAL
EOSINOPHIL # BLD AUTO: 0 K/UL
EOSINOPHIL # BLD AUTO: 0 K/UL
EOSINOPHIL NFR BLD: 0 %
EOSINOPHIL NFR BLD: 0.2 %
ERYTHROCYTE [DISTWIDTH] IN BLOOD BY AUTOMATED COUNT: 13 %
ERYTHROCYTE [DISTWIDTH] IN BLOOD BY AUTOMATED COUNT: 13 %
HCT VFR BLD AUTO: 34.3 %
HCT VFR BLD AUTO: 36.1 %
HGB BLD-MCNC: 11.7 G/DL
HGB BLD-MCNC: 12.2 G/DL
LYMPHOCYTES # BLD AUTO: 1.1 K/UL
LYMPHOCYTES # BLD AUTO: 2.4 K/UL
LYMPHOCYTES NFR BLD: 12.1 %
LYMPHOCYTES NFR BLD: 4.4 %
MCH RBC QN AUTO: 29.6 PG
MCH RBC QN AUTO: 29.7 PG
MCHC RBC AUTO-ENTMCNC: 33.8 G/DL
MCHC RBC AUTO-ENTMCNC: 34.1 G/DL
MCV RBC AUTO: 87 FL
MCV RBC AUTO: 88 FL
MONOCYTES # BLD AUTO: 1.4 K/UL
MONOCYTES # BLD AUTO: 1.4 K/UL
MONOCYTES NFR BLD: 5.3 %
MONOCYTES NFR BLD: 7.1 %
NEUTROPHILS # BLD AUTO: 15.6 K/UL
NEUTROPHILS # BLD AUTO: 23.4 K/UL
NEUTROPHILS NFR BLD: 80 %
NEUTROPHILS NFR BLD: 90 %
PLATELET # BLD AUTO: 208 K/UL
PLATELET # BLD AUTO: 223 K/UL
PLATELET BLD QL SMEAR: ABNORMAL
PMV BLD AUTO: 10.4 FL
PMV BLD AUTO: 10.5 FL
RBC # BLD AUTO: 3.94 M/UL
RBC # BLD AUTO: 4.12 M/UL
WBC # BLD AUTO: 19.5 K/UL
WBC # BLD AUTO: 25.97 K/UL

## 2018-08-23 PROCEDURE — 59400 OBSTETRICAL CARE: CPT | Mod: ,,, | Performed by: ADVANCED PRACTICE MIDWIFE

## 2018-08-23 PROCEDURE — 72100002 HC LABOR CARE, 1ST 8 HOURS

## 2018-08-23 PROCEDURE — 0HQ9XZZ REPAIR PERINEUM SKIN, EXTERNAL APPROACH: ICD-10-PCS | Performed by: OBSTETRICS & GYNECOLOGY

## 2018-08-23 PROCEDURE — 63600175 PHARM REV CODE 636 W HCPCS

## 2018-08-23 PROCEDURE — 72200004 HC VAGINAL DELIVERY LEVEL I

## 2018-08-23 PROCEDURE — 0502F SUBSEQUENT PRENATAL CARE: CPT | Mod: ,,, | Performed by: ADVANCED PRACTICE MIDWIFE

## 2018-08-23 PROCEDURE — 96372 THER/PROPH/DIAG INJ SC/IM: CPT

## 2018-08-23 PROCEDURE — 85025 COMPLETE CBC W/AUTO DIFF WBC: CPT

## 2018-08-23 PROCEDURE — 11000001 HC ACUTE MED/SURG PRIVATE ROOM

## 2018-08-23 PROCEDURE — 36415 COLL VENOUS BLD VENIPUNCTURE: CPT

## 2018-08-23 PROCEDURE — 25000003 PHARM REV CODE 250: Performed by: ADVANCED PRACTICE MIDWIFE

## 2018-08-23 RX ORDER — LIDOCAINE HCL/PF 100 MG/5ML
SYRINGE (ML) INTRAVENOUS
Status: COMPLETED
Start: 2018-08-23 | End: 2018-08-23

## 2018-08-23 RX ORDER — OXYTOCIN/RINGER'S LACTATE 20/1000 ML
41.65 PLASTIC BAG, INJECTION (ML) INTRAVENOUS CONTINUOUS
Status: ACTIVE | OUTPATIENT
Start: 2018-08-23 | End: 2018-08-23

## 2018-08-23 RX ORDER — DOCUSATE SODIUM 100 MG/1
200 CAPSULE, LIQUID FILLED ORAL 2 TIMES DAILY PRN
Status: DISCONTINUED | OUTPATIENT
Start: 2018-08-23 | End: 2018-08-24 | Stop reason: HOSPADM

## 2018-08-23 RX ORDER — NAPROXEN 500 MG/1
500 TABLET ORAL EVERY 8 HOURS PRN
Status: DISCONTINUED | OUTPATIENT
Start: 2018-08-23 | End: 2018-08-24 | Stop reason: HOSPADM

## 2018-08-23 RX ORDER — CARBOPROST TROMETHAMINE 250 UG/ML
INJECTION, SOLUTION INTRAMUSCULAR
Status: DISCONTINUED
Start: 2018-08-23 | End: 2018-08-23 | Stop reason: WASHOUT

## 2018-08-23 RX ORDER — DIPHENHYDRAMINE HYDROCHLORIDE 50 MG/ML
25 INJECTION INTRAMUSCULAR; INTRAVENOUS EVERY 4 HOURS PRN
Status: DISCONTINUED | OUTPATIENT
Start: 2018-08-23 | End: 2018-08-24 | Stop reason: HOSPADM

## 2018-08-23 RX ORDER — HYDROCORTISONE 25 MG/G
CREAM TOPICAL 3 TIMES DAILY PRN
Status: DISCONTINUED | OUTPATIENT
Start: 2018-08-23 | End: 2018-08-24 | Stop reason: HOSPADM

## 2018-08-23 RX ORDER — METHYLERGONOVINE MALEATE 0.2 MG/ML
INJECTION INTRAVENOUS
Status: DISCONTINUED
Start: 2018-08-23 | End: 2018-08-23 | Stop reason: WASHOUT

## 2018-08-23 RX ORDER — ACETAMINOPHEN 325 MG/1
650 TABLET ORAL EVERY 6 HOURS PRN
Status: DISCONTINUED | OUTPATIENT
Start: 2018-08-23 | End: 2018-08-24 | Stop reason: HOSPADM

## 2018-08-23 RX ORDER — DIPHENHYDRAMINE HCL 25 MG
25 CAPSULE ORAL EVERY 4 HOURS PRN
Status: DISCONTINUED | OUTPATIENT
Start: 2018-08-23 | End: 2018-08-24 | Stop reason: HOSPADM

## 2018-08-23 RX ORDER — MISOPROSTOL 200 UG/1
TABLET ORAL
Status: DISCONTINUED
Start: 2018-08-23 | End: 2018-08-23 | Stop reason: WASHOUT

## 2018-08-23 RX ORDER — OXYTOCIN 10 [USP'U]/ML
INJECTION, SOLUTION INTRAMUSCULAR; INTRAVENOUS
Status: COMPLETED
Start: 2018-08-23 | End: 2018-08-23

## 2018-08-23 RX ORDER — MISOPROSTOL 200 UG/1
600 TABLET ORAL
Status: DISCONTINUED | OUTPATIENT
Start: 2018-08-23 | End: 2018-08-24 | Stop reason: HOSPADM

## 2018-08-23 RX ORDER — ONDANSETRON 8 MG/1
8 TABLET, ORALLY DISINTEGRATING ORAL EVERY 8 HOURS PRN
Status: DISCONTINUED | OUTPATIENT
Start: 2018-08-23 | End: 2018-08-24 | Stop reason: HOSPADM

## 2018-08-23 RX ORDER — LIDOCAINE HYDROCHLORIDE 20 MG/ML
5 INJECTION, SOLUTION EPIDURAL; INFILTRATION; INTRACAUDAL; PERINEURAL ONCE
Status: DISCONTINUED | OUTPATIENT
Start: 2018-08-23 | End: 2018-08-23

## 2018-08-23 RX ADMIN — NAPROXEN 500 MG: 500 TABLET ORAL at 04:08

## 2018-08-23 RX ADMIN — OXYTOCIN 10 UNITS: 10 INJECTION, SOLUTION INTRAMUSCULAR; INTRAVENOUS at 12:08

## 2018-08-23 RX ADMIN — LIDOCAINE HYDROCHLORIDE 100 MG: 20 INJECTION, SOLUTION INTRAVENOUS at 12:08

## 2018-08-23 NOTE — H&P
[facility]  Obstetrics  History & Physical    Patient Name: Nicolasa Wagoner  MRN: [id number]  Admission Date: 2018  Primary Care Provider: Miguelina Mayen MD    Subjective:     Principal Problem:Normal labor    History of Present Illness:  History of Present Illness:   Nicolasa Wagoner is a 33 y.o. female  at 40w0d with Estimated Date of Delivery: 18 based on LMP who presents to Labor and Delivery accompanied by her , James. Expecting a girl!    Reports regular uterine contractions since approx 3pm. They are now 2-3 minutes apart and increasing in intensity and duration. Initially presents to OB ED and found to be 5cm, progressed to 6-7cm following 2hrs ambulation. Desires hydrotherapy in tub. Moderate contractions, active fetal movement, No vaginal bleeding , No loss of fluid.     Last ate chicken salad wrap at 1130, has been sipping water throughout.     This pregnancy has been complicated by unconjugated hyperbilirubinemia and Gilbert Syndrome     Presentation:  Vertex  Estimated Fetal Weight: 6.5lbs      Birth Center Risk Assessment:  1 - unable to transfer due to no [facility] RN available.  0. CNM management in [facility]  1. CNM management on L&D  2. Consultation with OB to develop plan of care  3. Collaborative CNM/OB management with delivery on L&D  4. Referral or transfer of care to MD        Obstetric HPI:      Obstetric History       T2      L2     SAB0   TAB0   Ectopic0   Multiple0   Live Births2       # Outcome Date GA Lbr Delbert/2nd Weight Sex Delivery Anes PTL Lv   3 Current            2 Term 16 39w5d  3.118 kg (6 lb 14 oz) M Vag-Spont Local N VERONICA      Name: TRENA,BABY BOY      Apgar1:  8                Apgar5: 9   1 Term 14 39w5d  2.92 kg (6 lb 7 oz) F Vag-Spont None N VERONICA      Name: Mercy        Past Medical History:   Diagnosis Date    Allergy     seasonal    Elevated bilirubin     Medical history non-contributory      Past Surgical  History:   Procedure Laterality Date    none         PTA Medications   Medication Sig    PRENATAL VIT W-CA,FE,FA,<1 MG, (PRENATAL VITAMIN ORAL) Take by mouth.       Review of patient's allergies indicates:  No Known Allergies     Family History     Problem Relation (Age of Onset)    Crohn's disease Mother    Diabetes Paternal Aunt        Tobacco Use    Smoking status: Never Smoker    Smokeless tobacco: Never Used   Substance and Sexual Activity    Alcohol use: No    Drug use: No    Sexual activity: Yes     Partners: Male     Birth control/protection: Condom     Review of Systems   Constitutional: Negative for activity change, chills, fever and unexpected weight change.   Eyes: Negative for visual disturbance.   Respiratory: Negative for cough and shortness of breath.    Cardiovascular: Negative for chest pain.   Gastrointestinal: Negative for abdominal pain, constipation, diarrhea, nausea and vomiting.   Genitourinary: Negative for dysuria, genital sores, vaginal bleeding, vaginal discharge and vaginal odor.   Skin:  Negative for rash.   Neurological: Negative for syncope and headaches.   Psychiatric/Behavioral: Negative for depression.      Objective:     Vital Signs (Most Recent):  Temp: 97 °F (36.1 °C) (08/22/18 1923)  Pulse: 72 (08/22/18 2227)  Resp: 18 (08/22/18 2203)  BP: (!) 116/56 (08/22/18 2203)  SpO2: 97 % (08/22/18 2227) Vital Signs (24h Range):  Temp:  [97 °F (36.1 °C)] 97 °F (36.1 °C)  Pulse:  [63-72] 72  Resp:  [18] 18  SpO2:  [97 %] 97 %  BP: (101-116)/(56-68) 116/56     Weight: 64.2 kg (141 lb 8.6 oz)  Body mass index is 23.55 kg/m².    FHT: Baseline 130, moderate BTBV, positive accels and no decels. Cat 1 (reassuring)  TOCO:  Q 2-4 minutes    Physical Exam:   Constitutional: She is oriented to person, place, and time. She appears well-developed and well-nourished.    HENT:   Head: Normocephalic and atraumatic.     Neck: Normal range of motion.    Cardiovascular: Normal rate and regular  rhythm.     Pulmonary/Chest: Effort normal. No respiratory distress.        Abdominal: Soft. Distention: Gravid. There is no tenderness. There is no guarding.             Musculoskeletal: Normal range of motion and moves all extremeties.       Neurological: She is alert and oriented to person, place, and time.    Skin: Skin is warm, dry and intact. No rash noted. No erythema.    Psychiatric: She has a normal mood and affect. Her behavior is normal.       Cervix:  Dilation:  6  Effacement:  80%  Station: -2  Presentation: Vertex     Significant Labs:  Lab Results   Component Value Date    GROUPTRH A POS 2018    HEPBSAG Negative 2018    STREPBCULT No Group B Streptococcus isolated 2018       I have personallly reviewed all pertinent lab results from the last 24 hours.    Assessment/Plan:     33 y.o. female  at 40w0d for:    * Normal labor    Admit to L&D  Reviewed consents signed and in chart  Obtain labs: CBC, T&S, CMP, and Hepatic function  Intermittent monitoring per protocol  Encouraged frequent position changes and hands and knees (suspect OP)  Pt declines IV  Encouraged continued oral hydration         Gilbert syndrome    Obtain CMP and Hepatic Function            Ember Miller CNM  Obstetrics  [facility]

## 2018-08-23 NOTE — ASSESSMENT & PLAN NOTE
Admit to L&D  Reviewed consents signed and in chart  Obtain labs: CBC, T&S, CMP, and Hepatic function  Intermittent monitoring per protocol  Encouraged frequent position changes and hands and knees (suspect OP)  Pt declines IV

## 2018-08-23 NOTE — ED PROVIDER NOTES
Encounter Date: 2018       History     Chief Complaint   Patient presents with    Contractions     Nicolasa Wagoner is a 33 y.o. female  at 40w0d with Estimated Date of Delivery: 18 based on LMP who presents to OB ED c/o regular uterine contractions.  Reports regular uterine contractions since 1500. They are now 2-5 minutes apart and increasing in intensity and duration.   She reports + FM. Denies VB or LOF.  Accompanied by her , James, to L&D. Expecting a girl!    Last ate chicken salad wrap at 1130, last drank water upon entering OB ED - continues sipping throughout.     Pregnancy has been c/b:     Unconjugated hyperbilirubinemia     Gilbert syndrome                Review of patient's allergies indicates:  No Known Allergies  Past Medical History:   Diagnosis Date    Allergy     seasonal    Elevated bilirubin     Medical history non-contributory      Past Surgical History:   Procedure Laterality Date    none       Family History   Problem Relation Age of Onset    Crohn's disease Mother     Diabetes Paternal Aunt     Colon cancer Neg Hx     Ovarian cancer Neg Hx     Breast cancer Neg Hx     Melanoma Neg Hx      Social History     Tobacco Use    Smoking status: Never Smoker    Smokeless tobacco: Never Used   Substance Use Topics    Alcohol use: No    Drug use: No     Review of Systems   Constitutional: Negative for fatigue and fever.   HENT: Negative for sore throat.    Respiratory: Negative for shortness of breath.    Cardiovascular: Negative for chest pain.   Gastrointestinal: Negative for nausea.   Genitourinary: Negative for dysuria, genital sores, vaginal bleeding and vaginal discharge.   Musculoskeletal: Negative for back pain.   Skin: Negative for rash.   Neurological: Negative for weakness.   Hematological: Does not bruise/bleed easily.   Psychiatric/Behavioral: The patient is not nervous/anxious.        Physical Exam     Initial Vitals   BP Pulse Resp Temp SpO2    08/22/18 1923 08/22/18 1923 08/22/18 1923 08/22/18 1923 08/22/18 2227   (!) 101/57 68 18 97 °F (36.1 °C) 97 %      MAP       --                Physical Exam    Constitutional: Vital signs are normal. She appears well-developed and well-nourished. She is cooperative. She does not have a sickly appearance. No distress.   HENT:   Head: Normocephalic and atraumatic.   Eyes: EOM are normal. Pupils are equal, round, and reactive to light.   Neck: Normal range of motion. Neck supple.   Cardiovascular: Normal rate and regular rhythm.   Pulmonary/Chest: Effort normal. No respiratory distress.   Abdominal: Soft. Distention: Gravid. There is no tenderness.   Fundus palpates soft between UCs.   Genitourinary: No vaginal discharge found.   Musculoskeletal: Normal range of motion.   Neurological: She is alert and oriented to person, place, and time.   Skin: Skin is warm and dry. Capillary refill takes less than 2 seconds.   Psychiatric: She has a normal mood and affect. Her behavior is normal. Judgment and thought content normal.     OB LABOR EXAM:     Membranes ruptured: No.     Vaginal Bleeding: none present.     Dilatation: 5.   Station: -2.   Effacement: 70%.   Amniotic Fluid Color: no fluid.     Comments: Exam per CN       ED Course   Obtain Fetal nonstress test (NST)  Date/Time: 8/22/2018 10:18 PM  Performed by: Ember Miller CNM  Authorized by: Jannette Ceja MD     Nonstress Test:     Variability:  >25 BPM    Decelerations:  None    Accelerations:  15 bpm    Acoustic Stimulator: No      Baseline:  135    Contractions:  Regular    Contraction Frequency:  Q 2-4 minutes  Biophysical Profile:     Nonstress Test Interpretation: reactive      Overall Impression:  Reassuring  Post-procedure:     Patient tolerance:  Patient tolerated the procedure well with no immediate complications      Labs Reviewed   HEPATIC FUNCTION PANEL   COMPREHENSIVE METABOLIC PANEL   CBC W/ AUTO DIFFERENTIAL   TYPE & SCREEN           Imaging Results    None          Medical Decision Making:   History:   Old Records Summarized: records from clinic visits.       <> Summary of Records: Seen in clinic this morning: was 4/50/-2.  Initial Assessment:   IUP at 40w 0d with regular uterine contractions    - Minimal cervical change to 5/70/-2 over last several hrs at home.  Pt desires continued ambulation, but no longer felt comfortable at home, or outside in the heat. Coping well with UCs. Will ambulate x2hrs at hospital, with cervical recheck following (sooner PRN).  Clinical Tests:   Lab Tests: Ordered  ED Management:  Following approximately 2hrs ambulation, patient returns to OB ED and reports contractions persist and are much more intense. Reports feeling some pressure and increased back pain with UCs. UCs q 2-3min, palpate moderate, fundus soft between.  VE: 6-7cm/80%/-2 (suspect OP positioning)    Admit to Labor and Delivery for Active Labor                      Clinical Impression:   The encounter diagnosis was Normal labor.                             Ember Miller CNM  08/22/18 2747

## 2018-08-23 NOTE — ED NOTES
Pt walking for 2 hrs. SAMANTHA Miller CNM aware. Pt told to return if labor intensifies before 2 hrs.

## 2018-08-23 NOTE — LACTATION NOTE
08/23/18 1745   Maternal Infant Assessment   Breast Shape round   Breast Density soft   Areola elastic   Nipple(s) everted;graspable   Infant Assessment   Sucking Reflex present   Rooting Reflex present   Swallow Reflex present   LATCH Score   Latch 2-->grasps breast, tongue down, lips flanged, rhythmic sucking   Audible Swallowing 2-->spontaneous and intermittent (24 hrs old)   Type Of Nipple 2-->everted (after stimulation)   Comfort (Breast/Nipple) 2-->soft/nontender   Hold (Positioning) 2-->no assist from staff, mother able to position/hold infant   Score (less than 7 for 2/more consecutive times, consult Lactation Consultant) 10   Maternal Infant Feeding   Infant Positioning cross-cradle   Signs of Milk Transfer audible swallow;breasts soften with feeding;infant jaw motion present   Time Spent (min) 0-15 min   Latch Assistance yes   Feeding Infant   Effective Latch During Feeding yes   Audible Swallow yes   Lactation Referrals   Lactation Consult Breastfeeding assessment;Initial assessment;Knowledge deficit   Lactation Interventions   Attachment Promotion breastfeeding assistance provided;skin-to-skin contact encouraged   Breastfeeding Assistance infant latch-on verified;infant suck/swallow verified;feeding session observed;support offered   Maternal Breastfeeding Support lactation counseling provided   Latch Promotion positioning assisted;infant moved to breast   Baby nursing in cross chest. Baby nursing well.

## 2018-08-23 NOTE — HPI
History of Present Illness:   Nicolasa Wagoner is a 33 y.o. female  at 40w0d with Estimated Date of Delivery: 18 based on LMP who presents to Labor and Delivery accompanied by her , James. Expecting a girl!    Reports regular uterine contractions since approx 3pm. They are now 2-3 minutes apart and increasing in intensity and duration. Initially presents to OB ED and found to be 5cm, progressed to 6-7cm following 2hrs ambulation. Desires hydrotherapy in tub. Moderate contractions, active fetal movement, No vaginal bleeding , No loss of fluid.     Last ate chicken salad wrap at 1130, has been sipping water throughout.     This pregnancy has been complicated by unconjugated hyperbilirubinemia and Gilbert Syndrome     Presentation:  Vertex  Estimated Fetal Weight: 6.5lbs      Birth Center Risk Assessment: 1 - unable to transfer due to no [facility] RN available.  0. CNM management in [facility]  1. CNM management on L&D  2. Consultation with OB to develop plan of care  3. Collaborative CNM/OB management with delivery on L&D  4. Referral or transfer of care to MD

## 2018-08-23 NOTE — SUBJECTIVE & OBJECTIVE
Obstetric HPI:      Obstetric History       T2      L2     SAB0   TAB0   Ectopic0   Multiple0   Live Births2       # Outcome Date GA Lbr Delbert/2nd Weight Sex Delivery Anes PTL Lv   3 Current            2 Term 16 39w5d  3.118 kg (6 lb 14 oz) M Vag-Spont Local N VERONICA      Name: TERNA,BABY BOY      Apgar1:  8                Apgar5: 9   1 Term 14 39w5d  2.92 kg (6 lb 7 oz) F Vag-Spont None N VERONICA      Name: Mercy        Past Medical History:   Diagnosis Date    Allergy     seasonal    Elevated bilirubin     Medical history non-contributory      Past Surgical History:   Procedure Laterality Date    none         PTA Medications   Medication Sig    PRENATAL VIT W-CA,FE,FA,<1 MG, (PRENATAL VITAMIN ORAL) Take by mouth.       Review of patient's allergies indicates:  No Known Allergies     Family History     Problem Relation (Age of Onset)    Crohn's disease Mother    Diabetes Paternal Aunt        Tobacco Use    Smoking status: Never Smoker    Smokeless tobacco: Never Used   Substance and Sexual Activity    Alcohol use: No    Drug use: No    Sexual activity: Yes     Partners: Male     Birth control/protection: Condom     Review of Systems   Constitutional: Negative for activity change, chills, fever and unexpected weight change.   Eyes: Negative for visual disturbance.   Respiratory: Negative for cough and shortness of breath.    Cardiovascular: Negative for chest pain.   Gastrointestinal: Negative for abdominal pain, constipation, diarrhea, nausea and vomiting.   Genitourinary: Negative for dysuria, genital sores, vaginal bleeding, vaginal discharge and vaginal odor.   Skin:  Negative for rash.   Neurological: Negative for syncope and headaches.   Psychiatric/Behavioral: Negative for depression.      Objective:     Vital Signs (Most Recent):  Temp: 97 °F (36.1 °C) (18)  Pulse: 72 (18)  Resp: 18 (18)  BP: (!) 116/56 (18)  SpO2: 97 % (18)  Vital Signs (24h Range):  Temp:  [97 °F (36.1 °C)] 97 °F (36.1 °C)  Pulse:  [63-72] 72  Resp:  [18] 18  SpO2:  [97 %] 97 %  BP: (101-116)/(56-68) 116/56     Weight: 64.2 kg (141 lb 8.6 oz)  Body mass index is 23.55 kg/m².    FHT: Baseline 130, moderate BTBV, positive accels and no decels. Cat 1 (reassuring)  TOCO:  Q 2-4 minutes    Physical Exam:   Constitutional: She is oriented to person, place, and time. She appears well-developed and well-nourished.    HENT:   Head: Normocephalic and atraumatic.     Neck: Normal range of motion.    Cardiovascular: Normal rate and regular rhythm.     Pulmonary/Chest: Effort normal. No respiratory distress.        Abdominal: Soft. Distention: Gravid. There is no tenderness. There is no guarding.             Musculoskeletal: Normal range of motion and moves all extremeties.       Neurological: She is alert and oriented to person, place, and time.    Skin: Skin is warm, dry and intact. No rash noted. No erythema.    Psychiatric: She has a normal mood and affect. Her behavior is normal.       Cervix:  Dilation:  6  Effacement:  80%  Station: -2  Presentation: Vertex     Significant Labs:  Lab Results   Component Value Date    GROUPTRH A POS 01/03/2018    HEPBSAG Negative 01/03/2018    STREPBCULT No Group B Streptococcus isolated 07/24/2018       I have personallly reviewed all pertinent lab results from the last 24 hours.

## 2018-08-23 NOTE — L&D DELIVERY NOTE
[facility]  Vaginal Delivery       SUMMARY     Spontaneous vaginal delivery of viable female infant, OP, while patient on hands and knees in tub, bilateral compound hand presentation, at 0026. Infant passed through maternal legs and immediately brought above surface and to patient's chest, short cord tightly wound x1 in partial knot around right calf, unable to reduce. Cord clamped and cut below leg and cord reduced.  Apgars 8/9, wt: 6lb 15oz., over a 1st degree perineum.   Infant handed to father for skin to skin and patient assisted out of tub, as placenta spontaneously delivering with patient next to tub.  Placenta intact via Ramey, 3 vessel cord, at 0033. Patient able to ambulate to bed. Pitocin 10U IM given.  Fundus firm, uterus intact.  1st degree perineal laceration repaired with 3-0 vicryl under local anesthesia. Small right labial lac noted to be hemostatic, with no repair needed.  EBL 370mL.    Patient tolerated delivery well. Sponge needle and lap counted correctly x2.  Mom and baby stable, with baby skin to skin and family bonding well.    Ember Miller CNM        Indications: Normal labor  Pregnancy complicated by:   Patient Active Problem List   Diagnosis    Unconjugated hyperbilirubinemia    Gilbert syndrome    Normal labor     Admitting GA: 40w1d    Delivery Information for  Ritu Wagoner    Birth information:  YOB: 2016   Time of birth: 12:26 AM   Sex: female   Head Delivery Date/Time: 8/23/2018 12:25 AM   Delivery type: Vaginal, Spontaneous Delivery   Gestational Age: 40w1d    Delivery Providers    Delivering clinician:  Ember Miller CNM   Provider Role    Sabi Santiago RN Delivery Nurse    Earnestine Gongora, BRYAN Registered Nurse    Prabha Ac, BRYAN Charge Nurse            Measurements    Weight:  3160 g  Length:  50.2 cm  Head circumference:  32.4 cm  Chest circumference:  32.4 cm         Apgars    Living status:  Living  Apgars:   1 min.:   5  min.:   10 min.:   15 min.:   20 min.:     Skin color:   0  1       Heart rate:   2  2       Reflex irritability:   2  2       Muscle tone:   2  2       Respiratory effort:   2  2       Total:   8  9       Apgars assigned by:  CISCO JARAMILLO RN         Operative Delivery    Forceps attempted?:  No  Vacuum extractor attempted?:  No         Shoulder Dystocia    Shoulder dystocia present?:  No           Presentation    Presentation:  Vertex  Position:  Middle Occiput Posterior           Interventions/Resuscitation    Method:  Bulb Suctioning, Tactile Stimulation       Cord    Vessels:  3 vessels  Complications:  Body  Nuchal Intervention:  clamped and cut  Nuchal Cord Description:  tight nuchal cord  Cord Around:  right lower extremity  Number of Loops:  1  Cord Clamped Date/Time:  2018 12:27 AM  Gases Sent?:  No       Placenta    Placenta delivery date/time:  2018 0033  Placenta removal:  Spontaneous  Placenta appearance:  Intact  Placenta disposition:  discarded           Labor Events:       labor: No     Labor Onset Date/Time:         Dilation Complete Date/Time: 2018 00:21     Start Pushing Date/Time: 2018 00:23     Rupture Date/Time: 18  0017         Rupture type: spontaneous rupture of membranes         Fluid Amount:        Fluid Color: clear      Fluid Odor:        Membrane Status (PeriCalm):        Rupture Date/Time (PeriCalm):        Fluid Amount (PeriCalm):        Fluid Color (PeriCalm):         steroids: None     Antibiotics given for GBS:       Induction: none     Indications for induction:        Augmentation:       Indications for augmentation:       Labor complications: None     Additional complications:          Cervical ripening:                     Delivery:      Episiotomy: None     Indication for Episiotomy:       Perineal Lacerations: 1st Repaired:  Yes   Periurethral Laceration: none Repaired:     Labial Laceration: none Repaired:     Sulcus Laceration: none  Repaired:     Vaginal Laceration: Yes Repaired:     Cervical Laceration: No Repaired:     Repair suture:       Repair # of packets: 1     Vaginal delivery QBL (mL): 370      QBL (mL): 0     Combined Blood Loss (mL): 370     Vaginal Sweep Performed: Yes     Surgicount Correct: No       Other providers:       Anesthesia    Method:  Local          Details (if applicable):  Trial of Labor      Categorization:      Priority:     Indications for :     Incision Type:       Additional  information:  Forceps:    Vacuum:    Breech:    Observed anomalies    Other (Comments): Unable to obtain cord blood.

## 2018-08-23 NOTE — PLAN OF CARE
Problem: Patient Care Overview  Goal: Plan of Care Review  Outcome: Ongoing (interventions implemented as appropriate)  VSS. Uterus firm w/o massage, bleeding continues to improve.  Pt ambulating independently, voiding spontaneously. Pain managed adequately w/medication. Plan of care reviewed w/pt and spouse. No new concerns expressed at this time. Will continue to monitor.

## 2018-08-23 NOTE — PROGRESS NOTES
S: Rounded on pt due to high white count on admission, directly following delivery and this afternoon (although decreasing). Pt denies uterine tenderness, breast complaints or feeling unwell at this time. She states that her son has had a cold and she had chills on admission but feels much better now.     O: FF @ U - no tenderness on exam    A:  32 y/o  @ 0 day PP  Healing well - normal involution     P:Repeat CBC in morning   Continue to facilitate rest and healing  Plan d/c in morning    My Cordero CNM

## 2018-08-24 VITALS
SYSTOLIC BLOOD PRESSURE: 113 MMHG | HEART RATE: 66 BPM | BODY MASS INDEX: 23.58 KG/M2 | OXYGEN SATURATION: 98 % | HEIGHT: 65 IN | WEIGHT: 141.56 LBS | DIASTOLIC BLOOD PRESSURE: 71 MMHG | RESPIRATION RATE: 18 BRPM | TEMPERATURE: 98 F

## 2018-08-24 LAB
BASOPHILS # BLD AUTO: 0.03 K/UL
BASOPHILS NFR BLD: 0.2 %
DIFFERENTIAL METHOD: ABNORMAL
EOSINOPHIL # BLD AUTO: 0.1 K/UL
EOSINOPHIL NFR BLD: 1 %
ERYTHROCYTE [DISTWIDTH] IN BLOOD BY AUTOMATED COUNT: 13.3 %
HCT VFR BLD AUTO: 35.1 %
HGB BLD-MCNC: 11.6 G/DL
LYMPHOCYTES # BLD AUTO: 3.3 K/UL
LYMPHOCYTES NFR BLD: 27.1 %
MCH RBC QN AUTO: 29.2 PG
MCHC RBC AUTO-ENTMCNC: 33 G/DL
MCV RBC AUTO: 88 FL
MONOCYTES # BLD AUTO: 0.5 K/UL
MONOCYTES NFR BLD: 4.4 %
NEUTROPHILS # BLD AUTO: 8.1 K/UL
NEUTROPHILS NFR BLD: 66.8 %
PLATELET # BLD AUTO: 193 K/UL
PMV BLD AUTO: 10.5 FL
RBC # BLD AUTO: 3.97 M/UL
WBC # BLD AUTO: 12.08 K/UL

## 2018-08-24 PROCEDURE — 85025 COMPLETE CBC W/AUTO DIFF WBC: CPT

## 2018-08-24 PROCEDURE — 36415 COLL VENOUS BLD VENIPUNCTURE: CPT

## 2018-08-24 NOTE — PLAN OF CARE
Problem: Patient Care Overview  Goal: Plan of Care Review  Outcome: Outcome(s) achieved Date Met: 08/24/18  LACTATION NOTE:  Mother will nurse baby on cue till content; will use breast compression and stimulation to keep baby actively sucking prn; will ensure baby has deep latch onto breast tissue and will observe for signs of milk transfer; will apply lanolin to nipple pc; will monitor baby's 24hr diaper counts; will call [facility] prn.

## 2018-08-24 NOTE — LACTATION NOTE
08/24/18 1240   Maternal Infant Assessment   Breast Density Left:;soft   Areola Left:;elastic   Infant Assessment   Sucking Reflex present   Rooting Reflex present   Swallow Reflex present   LATCH Score   Latch 2-->grasps breast, tongue down, lips flanged, rhythmic sucking   Audible Swallowing 2-->spontaneous and intermittent (24 hrs old)   Type Of Nipple 2-->everted (after stimulation)   Comfort (Breast/Nipple) 2-->soft/nontender   Hold (Positioning) 2-->no assist from staff, mother able to position/hold infant   Score (less than 7 for 2/more consecutive times, consult Lactation Consultant) 10   Pain/Comfort Assessments   Pain Assessment Performed Yes       Number Scale   Presence of Pain denies   Location nipple(s)   Pain Rating: Activity 0   Maternal Infant Feeding   Maternal Emotional State independent;relaxed   Infant Positioning cradle  (L)   Signs of Milk Transfer infant jaw motion present   Time Spent (min) 30-60 min   Latch Assistance no   Feeding Infant   Effective Latch During Feeding yes   Audible Swallow no   Suck/Swallow Coordination present   Skin-to-Skin Contact During Feeding no   Lactation Referrals   Lactation Consult Breastfeeding assessment;Follow up;Knowledge deficit   Lactation Referrals outpatient lactation program;pediatric care provider;support group   Lactation Interventions   Attachment Promotion counseling provided;face-to-face positioning promoted;family involvement promoted;privacy provided;rooming-in promoted;skin-to-skin contact encouraged   Breastfeeding Assistance feeding cue recognition promoted;feeding on demand promoted;feeding session observed;infant latch-on verified;infant suck/swallow verified;support offered   Maternal Breastfeeding Support diary/feeding log utilized;encouragement offered;lactation counseling provided;maternal hydration promoted;maternal nutrition promoted;maternal rest encouraged

## 2018-08-24 NOTE — PLAN OF CARE
Problem: Patient Care Overview  Goal: Plan of Care Review  Outcome: Ongoing (interventions implemented as appropriate)  VSS. Uterus firm w/o massage, bleeding continues to improve.  Pt ambulating independently, voiding spontaneously. Patient denies pain and refuses pain medication. Plan of care reviewed w/pt and spouse. No new concerns expressed at this time.  Will continue to monitor.

## 2018-08-24 NOTE — DISCHARGE SUMMARY
"[facility]  Obstetrics  Discharge Summary      Patient Name: Nicolasa Wagoner  MRN: [id number]  Admission Date: 2018  Hospital Length of Stay: 2 days  Discharge Date and Time:  2018 3:45 PM  Attending Physician: Ellen Kendrick MD   Discharging Provider: My Cordero CNM  Primary Care Provider: Miguelina Mayen MD    HPI: History of Present Illness:   Nicolasa Wagoner is a 33 y.o. female  at 40w0d with Estimated Date of Delivery: 18 based on LMP who presents to Labor and Delivery accompanied by her , James. Expecting a girl!    Reports regular uterine contractions since approx 3pm. They are now 2-3 minutes apart and increasing in intensity and duration. Initially presents to OB ED and found to be 5cm, progressed to 6-7cm following 2hrs ambulation. Desires hydrotherapy in tub. Moderate contractions, active fetal movement, No vaginal bleeding , No loss of fluid.     Last ate chicken salad wrap at 1130, has been sipping water throughout.     This pregnancy has been complicated by unconjugated hyperbilirubinemia and Gilbert Syndrome     Presentation:  Vertex  Estimated Fetal Weight: 6.5lbs      Birth Center Risk Assessment:  1 - unable to transfer due to no [facility] RN available.  0. CNM management in [facility]  1. CNM management on L&D  2. Consultation with OB to develop plan of care  3. Collaborative CNM/OB management with delivery on L&D  4. Referral or transfer of care to MD        * No surgery found *     Hospital Course:   18 at 11pm:  at 40w 0d presents to L&D in active labor   2018:   2018: Normal PP course      Doing well, ambulating, voiding, and tolerating regular diet  Lochia: steadily decreasing  Pain: well controlled without any medication   Breasts/nipples: breast feeding well without  Depression/anxiety: denies  Support at home: excellent - partner very supportive and "helpful" family in town for next week or two   Contraception: " considering undecided  : Angela is doing well, will f/u with pediatrician     Gen: A&O x 4, NAD  CV: normal HR  Lungs: normal resp effort  Breasts: bilaterally soft, non-tender, nipples intact   Abdomen: soft, non-tender, uterus firm at U - 2 fb  Perineum: approximated, no edema   Lochia: minimal rubra  Ext: bilaterally no pedal edema without signs of DVT      Final Active Diagnoses:    Diagnosis Date Noted POA    PRINCIPAL PROBLEM:   (normal spontaneous vaginal delivery) [O80] 2018 Not Applicable    1st degree vaginal laceration [O71.4] 2018 No    Gilbert syndrome [E80.4] 2018 Yes    Unconjugated hyperbilirubinemia [E80.6] 2018 Yes      Problems Resolved During this Admission:    Diagnosis Date Noted Date Resolved POA    Normal labor [O80, Z37.9] 2018 Not Applicable      Reviewed with pt to be seen by hepatology in next few weeks for follow-up.     Labs: All labs within the past 24 hours have been reviewed    Feeding Method: breast    Immunizations     Date Immunization Status Dose Route/Site Given by    18 0236 MMR Incomplete 0.5 mL Subcutaneous/Left deltoid     18 0236 Tdap Incomplete 0.5 mL Intramuscular/Left deltoid           Delivery:    Episiotomy: None   Lacerations: 1st   Repair suture:     Repair # of packets: 1   Blood loss (ml): 370     Birth information:  YOB: 2016   Time of birth: 12:26 AM   Sex: female   Delivery type: Vaginal, Spontaneous Delivery   Gestational Age: 40w1d    Delivery Clinician:      Other providers:       Additional  information:  Forceps:    Vacuum:    Breech:    Observed anomalies      Living?:           APGARS  One minute Five minutes Ten minutes   Skin color:         Heart rate:         Grimace:         Muscle tone:         Breathing:         Totals: 8  9        Placenta: Delivered:       appearance    Pending Diagnostic Studies:     None          Discharged Condition: good    Disposition: Home or  Self Care    Follow Up:  Follow-up Information     yM Cordero CNM In 5 weeks.    Specialty:  Obstetrics and Gynecology  Why:  PP visit   Contact information:  [address] [address]  [phone]                 Patient Instructions:      Diet Adult Regular     Notify your health care provider if you experience any of the following:  temperature >100.4     Notify your health care provider if you experience any of the following:  persistent nausea and vomiting or diarrhea     Notify your health care provider if you experience any of the following:  severe uncontrolled pain     Notify your health care provider if you experience any of the following:  redness, tenderness, or signs of infection (pain, swelling, redness, odor or green/yellow discharge around incision site)     Notify your health care provider if you experience any of the following:  difficulty breathing or increased cough     Notify your health care provider if you experience any of the following:  severe persistent headache     Notify your health care provider if you experience any of the following:  worsening rash     Notify your health care provider if you experience any of the following:  persistent dizziness, light-headedness, or visual disturbances     Notify your health care provider if you experience any of the following:  increased confusion or weakness     Notify your health care provider if you experience any of the following:     Activity as tolerated     Medications:  Current Discharge Medication List      CONTINUE these medications which have NOT CHANGED    Details   PRENATAL VIT W-CA,FE,FA,<1 MG, (PRENATAL VITAMIN ORAL) Take by mouth.             My Cordero CNM  Obstetrics  [facility]

## 2018-08-25 ENCOUNTER — HOSPITAL ENCOUNTER (EMERGENCY)
Facility: OTHER | Age: 33
Discharge: HOME | End: 2018-08-25
Attending: OBSTETRICS & GYNECOLOGY
Payer: OTHER GOVERNMENT

## 2018-08-25 VITALS
DIASTOLIC BLOOD PRESSURE: 69 MMHG | OXYGEN SATURATION: 98 % | RESPIRATION RATE: 18 BRPM | TEMPERATURE: 99 F | SYSTOLIC BLOOD PRESSURE: 116 MMHG | HEART RATE: 108 BPM

## 2018-08-25 DIAGNOSIS — N71.0 ACUTE ENDOMETRITIS: Primary | ICD-10-CM

## 2018-08-25 LAB
BASOPHILS # BLD AUTO: 0.02 K/UL
BASOPHILS NFR BLD: 0.1 %
BILIRUB UR QL STRIP: NEGATIVE
CLARITY UR: CLEAR
COLOR UR: YELLOW
DIFFERENTIAL METHOD: ABNORMAL
EOSINOPHIL # BLD AUTO: 0 K/UL
EOSINOPHIL NFR BLD: 0.1 %
ERYTHROCYTE [DISTWIDTH] IN BLOOD BY AUTOMATED COUNT: 13.2 %
GIANT PLATELETS BLD QL SMEAR: PRESENT
GLUCOSE UR QL STRIP: NEGATIVE
HCT VFR BLD AUTO: 35.9 %
HGB BLD-MCNC: 12.4 G/DL
HGB UR QL STRIP: ABNORMAL
KETONES UR QL STRIP: NEGATIVE
LEUKOCYTE ESTERASE UR QL STRIP: ABNORMAL
LYMPHOCYTES # BLD AUTO: 0.9 K/UL
LYMPHOCYTES NFR BLD: 3.7 %
MCH RBC QN AUTO: 30.4 PG
MCHC RBC AUTO-ENTMCNC: 34.5 G/DL
MCV RBC AUTO: 88 FL
MICROSCOPIC COMMENT: NORMAL
MONOCYTES # BLD AUTO: 0.8 K/UL
MONOCYTES NFR BLD: 3.5 %
NEUTROPHILS # BLD AUTO: 21.9 K/UL
NEUTROPHILS NFR BLD: 92.6 %
NITRITE UR QL STRIP: NEGATIVE
PH UR STRIP: 7 [PH] (ref 5–8)
PLATELET # BLD AUTO: 211 K/UL
PMV BLD AUTO: 9.8 FL
PROT UR QL STRIP: NEGATIVE
RBC # BLD AUTO: 4.08 M/UL
RBC #/AREA URNS HPF: 0 /HPF (ref 0–4)
SCHISTOCYTES BLD QL SMEAR: PRESENT
SP GR UR STRIP: <=1.005 (ref 1–1.03)
URN SPEC COLLECT METH UR: ABNORMAL
UROBILINOGEN UR STRIP-ACNC: NEGATIVE EU/DL
WBC # BLD AUTO: 23.78 K/UL
WBC #/AREA URNS HPF: 5 /HPF (ref 0–5)

## 2018-08-25 PROCEDURE — 81000 URINALYSIS NONAUTO W/SCOPE: CPT

## 2018-08-25 PROCEDURE — 85025 COMPLETE CBC W/AUTO DIFF WBC: CPT

## 2018-08-25 PROCEDURE — 99283 EMERGENCY DEPT VISIT LOW MDM: CPT

## 2018-08-25 PROCEDURE — 99284 EMERGENCY DEPT VISIT MOD MDM: CPT | Mod: 24,,, | Performed by: OBSTETRICS & GYNECOLOGY

## 2018-08-25 RX ORDER — AMOXICILLIN AND CLAVULANATE POTASSIUM 875; 125 MG/1; MG/1
1 TABLET, FILM COATED ORAL 2 TIMES DAILY
Qty: 14 TABLET | Refills: 0 | Status: SHIPPED | OUTPATIENT
Start: 2018-08-25 | End: 2018-09-01

## 2018-08-25 NOTE — DISCHARGE INSTRUCTIONS
Call clinic [phone] or L & D after hours at [phone] for any new complaints, or headache not relieved by Tylenol, blurry vision, or temp of 100.4 or greater.

## 2018-08-25 NOTE — ED PROVIDER NOTES
Encounter Date: 2018       History     Chief Complaint   Patient presents with    Fever     pt states she took 400 mg ibuprofen at 1130     34 y/o  now on postpartum day 2.  Pregnancy was complicated by a mild elevation in bilirubin with subsequent diagnosis of Gilbert syndrome.   on 18 of a viable female infant over a 1st degree perineal laceration which was repaired.  Discharged home yesterday.  Did not sleep much last night as baby was nursing frequently.  This morning has had lower back pain and lower abdominal cramping which is more intense then when she was immediately post-partum.  She has also had a mild headache, sinus pressure, dry throat and fever/chills. Her highest temperature at home was 101 around 1300. She did take Ibuprofen 400 mg around noon.  She is breastfeeding without difficulty and feels that her milk is coming in.  Does have some mild tenderness when baby first latches.  Bleeding has been minimal today and there has been no foul odor.  Denies pain or swelling in lower legs.  She had a 1st degree laceration, no epidural, she had a tub birth.  She was not ruptured for prolonged period.  She was GBS neg.  She has had no n/v          Review of patient's allergies indicates:  No Known Allergies  Past Medical History:   Diagnosis Date    1st degree vaginal laceration 2018    Allergy     seasonal    Elevated bilirubin     Medical history non-contributory      Past Surgical History:   Procedure Laterality Date    none       Family History   Problem Relation Age of Onset    Crohn's disease Mother     Diabetes Paternal Aunt     Colon cancer Neg Hx     Ovarian cancer Neg Hx     Breast cancer Neg Hx     Melanoma Neg Hx      Social History     Tobacco Use    Smoking status: Never Smoker    Smokeless tobacco: Never Used   Substance Use Topics    Alcohol use: No    Drug use: No     Review of Systems   Constitutional: Positive for chills, diaphoresis and fever.   HENT:  Positive for sinus pressure. Negative for ear pain, sinus pain and sore throat.    Eyes: Negative for visual disturbance.   Respiratory: Negative for cough and shortness of breath.    Cardiovascular: Negative for chest pain and leg swelling.   Gastrointestinal: Positive for abdominal pain. Negative for constipation, diarrhea, nausea and vomiting.   Genitourinary: Positive for pelvic pain and vaginal bleeding. Negative for difficulty urinating and dysuria.   Musculoskeletal: Positive for back pain.   Skin: Negative for rash.   Neurological: Positive for headaches. Negative for weakness and light-headedness.   Hematological: Does not bruise/bleed easily.       Physical Exam     Initial Vitals   BP Pulse Resp Temp SpO2   08/25/18 1421 08/25/18 1421 08/25/18 1421 08/25/18 1421 08/25/18 1422   116/69 108 18 98.5 °F (36.9 °C) 98 %      MAP       --                Physical Exam    Nursing note and vitals reviewed.  Constitutional: She appears well-developed and well-nourished. She is not diaphoretic. No distress.   HENT:   Head: Normocephalic and atraumatic.   Eyes: Conjunctivae are normal. Right eye exhibits no discharge. Left eye exhibits no discharge.   Neck: Normal range of motion. Neck supple.   Cardiovascular: Regular rhythm and normal heart sounds.   Pulse mildly elevated at 100   Pulmonary/Chest: Breath sounds normal.   Abdominal: Soft. There is tenderness. There is no rebound and no guarding.   FF, mild tenderness to palpation   Genitourinary:   Genitourinary Comments: NEFG with small amount of blood at introitus.  She has 1st degree laceration that is not tender and not swollen.  Normal smelling lochia.  Bimanual localizes tenderness to fundus   Musculoskeletal: Normal range of motion. She exhibits no edema or tenderness.   Neurological: She is alert and oriented to person, place, and time.   Skin: Skin is warm and dry. No rash noted. No erythema.   Psychiatric: She has a normal mood and affect. Her behavior is  normal. Thought content normal.     No CVAT  Breasts full, nipples intact, no redness    ED Course   Procedures  Labs Reviewed - No data to display       Imaging Results    None          Medical Decision Making:   ED Management:  Exam consistent with endometritis.  I believe Nicolasa is a candidate for outpatient treatment with augmentin  She is given strict precautions to come back for no improvement or worsening in condition over next 24-48 hours, unable to tolerate po, temperatures increasing, or any other concern.  Pt verbalized understanding                      Clinical Impression:     1. Acute endometritis    2. Postpartum fever          Disposition:   Disposition: Discharged  Condition: Stable           Sandra Nettles, KARON  08/25/18 1606      Rebecca DO Rebecca Parsons DO  08/25/18 1611

## 2018-10-04 ENCOUNTER — POSTPARTUM VISIT (OUTPATIENT)
Dept: OBSTETRICS AND GYNECOLOGY | Facility: CLINIC | Age: 33
End: 2018-10-04
Payer: OTHER GOVERNMENT

## 2018-10-04 VITALS
BODY MASS INDEX: 20.8 KG/M2 | DIASTOLIC BLOOD PRESSURE: 76 MMHG | WEIGHT: 124.88 LBS | SYSTOLIC BLOOD PRESSURE: 114 MMHG | HEIGHT: 65 IN

## 2018-10-04 DIAGNOSIS — Z00.00 PREVENTATIVE HEALTH CARE: Primary | ICD-10-CM

## 2018-10-04 PROCEDURE — 99212 OFFICE O/P EST SF 10 MIN: CPT | Mod: PBBFAC | Performed by: ADVANCED PRACTICE MIDWIFE

## 2018-10-04 PROCEDURE — 0503F POSTPARTUM CARE VISIT: CPT | Mod: ,,, | Performed by: ADVANCED PRACTICE MIDWIFE

## 2018-10-04 PROCEDURE — 88175 CYTOPATH C/V AUTO FLUID REDO: CPT

## 2018-10-04 PROCEDURE — 87624 HPV HI-RISK TYP POOLED RSLT: CPT

## 2018-10-04 PROCEDURE — 99999 PR PBB SHADOW E&M-EST. PATIENT-LVL II: CPT | Mod: PBBFAC,,, | Performed by: ADVANCED PRACTICE MIDWIFE

## 2018-10-05 NOTE — PROGRESS NOTES
Nicolasa Wagoner is a 33 y.o.  is here for a postpartum visit.  Estimated Date of Delivery: 18     DELIVERY HISTORY   delivery on 18 at term gestation, LFI infant, weight 3160 g, first degree laceration with repair. Breastfeeding infant. Brings baby Angela with her to visit.     Spontaneous vaginal delivery of viable female infant, OP, while patient on hands and knees in tub, bilateral compound hand presentation, at 0026. Infant passed through maternal legs and immediately brought above surface and to patient's chest, short cord tightly wound x1 in partial knot around right calf, unable to reduce. Cord clamped and cut below leg and cord reduced.  Apgars 8/9, wt: 6lb 15oz., over a 1st degree perineum.   Infant handed to father for skin to skin and patient assisted out of tub, as placenta spontaneously delivering with patient next to tub.  Placenta intact via Ramey, 3 vessel cord, at 0033. Patient able to ambulate to bed. Pitocin 10U IM given.  Fundus firm, uterus intact.  1st degree perineal laceration repaired with 3-0 vicryl under local anesthesia. Small right labial lac noted to be hemostatic, with no repair needed.  EBL 370mL.    Patient tolerated delivery well. Sponge needle and lap counted correctly x2.  Mom and baby stable, with baby skin to skin and family bonding well.    C/C: Postpartum exam.     Pregnancy was c/b by:  Patient Active Problem List    Diagnosis Date Noted    Postpartum care and examination 10/04/2018     (normal spontaneous vaginal delivery) 2018    1st degree vaginal laceration 2018    Unconjugated hyperbilirubinemia 2018    Gilbert syndrome 2018       HPI:  Doing well; ambulating and tolerating regular diet  Lochia: none/stopped   Vaginal pain: denies  Abdominal pain: denies   Breasts/nipples:  breastfeeding well without difficulty and infant is gaining appropriately per their pediatrician  Bowel/bladder function:  "normal/normal  Depression/anxiety: denies ; EPDS score: 6  -- Did have postpartum ED visit for endometritis and abx treatment; then recently dealing with son's health - was admitted for croup and pneumonia - doing better know but was chaotic for her family  Support at home: adequate  Contraception: considering NFP/CHEEK    Pap hx:  negative    Past Medical History:   Diagnosis Date    1st degree vaginal laceration 2018    Allergy     seasonal    Elevated bilirubin     Medical history non-contributory      Past Surgical History:   Procedure Laterality Date    none       Review of patient's allergies indicates:  No Known Allergies    Current Outpatient Medications:     PRENATAL VIT W-CA,FE,FA,<1 MG, (PRENATAL VITAMIN ORAL), Take by mouth., Disp: , Rfl:     PE:  OB History    Para Term  AB Living   3 3 3     3   SAB TAB Ectopic Multiple Live Births         0 3      # Outcome Date GA Lbr Delbert/2nd Weight Sex Delivery Anes PTL Lv   3 Term 18 40w1d / 00:05 3.16 kg (6 lb 15.5 oz) F Vag-Spont Local N VERONICA   2 Term 16 39w5d  3.118 kg (6 lb 14 oz) M Vag-Spont Local N VERONICA   1 Term 14 39w5d  2.92 kg (6 lb 7 oz) F Vag-Spont None N VERONICA      Birth Comments: Concern for IUGR         Vitals:    10/04/18 1507   BP: 114/76     /76   Ht 5' 5" (1.651 m)   Wt 56.7 kg (124 lb 14.3 oz)   LMP 11/15/2017 (Exact Date)   Breastfeeding? Yes   BMI 20.78 kg/m²   Gen: A&O x 4, NAD  Neck: non-tender, not enlarged, no masses or nodules  CV: normal HR  Lungs: normal resp effort  Breasts: lactating  bilaterally: no masses, non-tender, nipples intact  Abdomen: soft, nontender, and nondistended, diastasis recti 1 cm, fundus np  Vulva/Vagina: healed well, approximated, non-tender.   Speculum: cervix appears closed, no abn lesions, no abn discharge  Bimanual: uterus involuted, non-tender, neg CMT, adnexa free bilaterally  Pap Smear today? yes    ASSESSMENT/PLAN:  Postpartum exam s/p  delivery  -- " "Counseling regarding resuming normal activities of exercise and work.  -- Postpartum precautions reviewed  -- Reviewed pap guidelines. Pap today as due early 2019 and she would rather just complete exam today  -- Continue annual exams; return then or sooner if any symptoms of pp depression or any other problem.    Birth control counseling  --- NFP/CHEEK; reviewed criteria    Breast Feeding  -- Continue PNV while breastfeeding.    Gilbert syndrome  -- Encouraged f/u per hepatology    Fam hx of BRCA  -- Reports her paternal aunts and possibly other paternal relatives "in a study about BRCA in [place]" but uncertain of exact dx. Encouraged her to find out and let me know; would offer referral to genetics if there is true family hx of BRCA      UPDATED MED LIST:  Current Outpatient Medications   Medication Sig Dispense Refill    PRENATAL VIT W-CA,FE,FA,<1 MG, (PRENATAL VITAMIN ORAL) Take by mouth.       No current facility-administered medications for this visit.        Ashley Marino CNM/LOUANN  10/4/18943:46 PM    "

## 2018-10-08 ENCOUNTER — PATIENT MESSAGE (OUTPATIENT)
Dept: OBSTETRICS AND GYNECOLOGY | Facility: CLINIC | Age: 33
End: 2018-10-08

## 2018-10-09 ENCOUNTER — PATIENT MESSAGE (OUTPATIENT)
Dept: HEPATOLOGY | Facility: CLINIC | Age: 33
End: 2018-10-09

## 2018-10-11 LAB
HPV HR 12 DNA CVX QL NAA+PROBE: NEGATIVE
HPV16 AG SPEC QL: NEGATIVE
HPV18 DNA SPEC QL NAA+PROBE: NEGATIVE

## 2018-10-15 DIAGNOSIS — Z84.81 FAMILY HISTORY OF BRCA GENE MUTATION: Primary | ICD-10-CM

## 2019-01-09 ENCOUNTER — TELEPHONE (OUTPATIENT)
Dept: SURGERY | Facility: CLINIC | Age: 34
End: 2019-01-09

## 2019-01-09 NOTE — TELEPHONE ENCOUNTER
Call made to Ms Wagoner regarding her apt on 1/24/19 to see  . The apt needs to be rescheduled with another one of our providers due to  being out on Maternity Leave. Please call Kandice back at [phone] I would be happy to reschedule this apt.

## 2019-01-17 ENCOUNTER — TELEPHONE (OUTPATIENT)
Dept: SURGERY | Facility: CLINIC | Age: 34
End: 2019-01-17

## 2019-01-17 NOTE — TELEPHONE ENCOUNTER
----- Message from Candice Alejo RN sent at 1/17/2019  3:17 PM CST -----  Contact: Pt  Breast patient.  ----- Message -----  From: Vickie Cannon  Sent: 1/17/2019   2:28 PM  To: Cayla GUPTA Staff    Needs Advice    Reason for call: Pt would like to reschedule her 01/21 appt to Feb         Communication Preference: [phone]    Additional Information:

## 2019-01-17 NOTE — TELEPHONE ENCOUNTER
Return call to pt. Call went to voice mail. Message left for pt to call back to reschedule appt per her request.

## 2019-02-11 ENCOUNTER — OFFICE VISIT (OUTPATIENT)
Dept: SURGERY | Facility: CLINIC | Age: 34
End: 2019-02-11
Attending: SURGERY
Payer: OTHER GOVERNMENT

## 2019-02-11 VITALS
HEART RATE: 65 BPM | WEIGHT: 116 LBS | TEMPERATURE: 98 F | SYSTOLIC BLOOD PRESSURE: 114 MMHG | BODY MASS INDEX: 19.33 KG/M2 | DIASTOLIC BLOOD PRESSURE: 73 MMHG | HEIGHT: 65 IN

## 2019-02-11 DIAGNOSIS — Z84.81 FAMILY HISTORY OF BRCA GENE MUTATION: Primary | ICD-10-CM

## 2019-02-11 PROCEDURE — 99213 OFFICE O/P EST LOW 20 MIN: CPT | Mod: PBBFAC,PO | Performed by: SURGERY

## 2019-02-11 PROCEDURE — 99999 PR PBB SHADOW E&M-EST. PATIENT-LVL III: CPT | Mod: PBBFAC,,, | Performed by: SURGERY

## 2019-02-11 PROCEDURE — 99203 OFFICE O/P NEW LOW 30 MIN: CPT | Mod: S$PBB,,, | Performed by: SURGERY

## 2019-02-11 PROCEDURE — 99999 PR PBB SHADOW E&M-EST. PATIENT-LVL III: ICD-10-PCS | Mod: PBBFAC,,, | Performed by: SURGERY

## 2019-02-11 PROCEDURE — 99203 PR OFFICE/OUTPT VISIT, NEW, LEVL III, 30-44 MIN: ICD-10-PCS | Mod: S$PBB,,, | Performed by: SURGERY

## 2019-02-11 NOTE — PROGRESS NOTES
BREAST HIGH RISK CLINIC  [facility]  Breast Surgery      Date of Visit:  2019    Referring provider:  Ashley Waters, CNM  [address] [place] [address]    PCP:  Miguelina Mayen MD    HIGH RISK    Nicolasa Wagoner is a 33 y.o. premenopausal female referred for evaluation of increased risk of breast cancer based on family history.  Here today to discussed options of high risk screening and risk reduction. Father's family is in a study,  people were tested positive for BRCA 2, including her father. No previous personal imaging or previous biopsies,     Other breast cancer risk factors include family hx on father's side.     Age of menarche was 14.  Last menstrual period was 2017.  Patient denies hormonal therapy, IVF on oral estrogen/progesterone x days    Patient is .  Age of first live birth was 28.  Patient did breast feed.     Family History is significant for the following:  Paternal paternal GM - stomach cancer, suspected ovarian cancer, passed at 64   Paternal, father aunt - 55, double mastectomy 58, now 65  Paternal father aunt - breast 55, uterine cancer 72  Paternal father aunt - breast cancer 64   Paternal father aunt's daughter - breast cancer at 47     paternal great grandfather and mother had  children test positive for BRCA2       Social History:   Smoking:  None   Drinking:  None     Review of Systems  Review of Systems   Constitutional: Negative for fever, malaise/fatigue and weight loss.   Eyes: Negative for discharge and redness.   Respiratory: Negative for cough and shortness of breath.    Cardiovascular: Negative for chest pain and leg swelling.   Gastrointestinal: Negative for abdominal pain, constipation, diarrhea, melena, nausea and vomiting.   Genitourinary: Negative for dysuria and hematuria.   Musculoskeletal: Negative for falls.   Skin: Negative for itching and rash.   Neurological: Negative for dizziness and weakness.  "  Psychiatric/Behavioral: Negative for substance abuse. The patient is not nervous/anxious.         Objective:   /73 (BP Location: Left arm, Patient Position: Sitting, BP Method: Medium (Automatic))   Pulse 65   Temp 97.5 °F (36.4 °C) (Oral)   Ht 5' 5" (1.651 m)   Wt 52.6 kg (116 lb)   LMP 10/11/2017   BMI 19.30 kg/m²     General: NAD  Chest: nonlabored breathing, no obvious deformity  Heart: regular rate  Abdomen: soft, nondistended  Extremities: no edema noted    Imaging none    TC score 13.8%      Assessment:     This is a 33 y.o. female with an increased risk of breast cancer based on paternal family history.        Plan:   - referral for BRCA testing due to family history       I have personally performed a detailed history and physical examination on this patient. My findings are summarized in the resident's note included in the record.  "

## 2019-02-11 NOTE — LETTER
February 11, 2019      Ashley Waters, CNM  [address]  [facility] [address]           [facility]  [address]  [facility] [address]  Phone: [phone]  Fax: [phone]          Patient: Nicolasa Wagoner   MR Number: [id number]   YOB: 1985   Date of Visit: 2/11/2019       Dear Ashley Waters:    Thank you for referring Nicolasa Wagoner to me for evaluation. Attached you will find relevant portions of my assessment and plan of care.    If you have questions, please do not hesitate to call me. I look forward to following Nicolasa Wagoner along with you.    Sincerely,    Michel Kulkarni MD    Enclosure  CC:  No Recipients    If you would like to receive this communication electronically, please contact [email or web address] or [phone] to request more information on [employer] access.    For providers and/or their staff who would like to refer a patient to [facility], please contact us through our one-stop-shop provider referral line, [facility], at [phone].    If you feel you have received this communication in error or would no longer like to receive these types of communications, please e-mail [email or web address]

## 2019-04-15 DIAGNOSIS — Z84.81 FAMILY HISTORY OF BRCA GENE POSITIVE: Primary | ICD-10-CM

## 2019-05-06 ENCOUNTER — TELEPHONE (OUTPATIENT)
Dept: SURGERY | Facility: CLINIC | Age: 34
End: 2019-05-06

## 2019-05-06 NOTE — TELEPHONE ENCOUNTER
Pt calls in asking if her genetic test results came in yet from [facility]. Pt was tested through [facility] as her father had testing there and pt was to follow in the same path with testing from [facility]. Per Dr Kulkarni, no results have been received as of today. Left message for pt on her voice mail that we do not have any results yet, and that if none arrive by the weeks end, may need to contact [facility]. Spoke with pt after message left, she will contact [facility] to see what the turnaround time is.